# Patient Record
Sex: MALE | Race: OTHER | NOT HISPANIC OR LATINO | Employment: FULL TIME | URBAN - METROPOLITAN AREA
[De-identification: names, ages, dates, MRNs, and addresses within clinical notes are randomized per-mention and may not be internally consistent; named-entity substitution may affect disease eponyms.]

---

## 2023-11-02 ENCOUNTER — HOSPITAL ENCOUNTER (INPATIENT)
Facility: HOSPITAL | Age: 53
LOS: 5 days | Discharge: HOME | DRG: 502 | End: 2023-11-07
Attending: EMERGENCY MEDICINE | Admitting: SURGERY
Payer: COMMERCIAL

## 2023-11-02 DIAGNOSIS — S51.002A: Primary | ICD-10-CM

## 2023-11-02 DIAGNOSIS — Z01.818 PREOP TESTING: ICD-10-CM

## 2023-11-02 DIAGNOSIS — S53.115A: Primary | ICD-10-CM

## 2023-11-02 DIAGNOSIS — S52.132C: ICD-10-CM

## 2023-11-02 LAB
ALBUMIN SERPL-MCNC: 3.7 G/DL (ref 3.5–5)
ALBUMIN/GLOB SERPL: 1.6 RATIO (ref 1.1–2)
ALP SERPL-CCNC: 49 UNIT/L (ref 40–150)
ALT SERPL-CCNC: 30 UNIT/L (ref 0–55)
AST SERPL-CCNC: 23 UNIT/L (ref 5–34)
BASOPHILS # BLD AUTO: 0.04 X10(3)/MCL
BASOPHILS NFR BLD AUTO: 0.2 %
BILIRUB SERPL-MCNC: 0.7 MG/DL
BUN SERPL-MCNC: 16.4 MG/DL (ref 8.4–25.7)
CALCIUM SERPL-MCNC: 8.4 MG/DL (ref 8.4–10.2)
CHLORIDE SERPL-SCNC: 107 MMOL/L (ref 98–107)
CK SERPL-CCNC: 387 U/L (ref 30–200)
CO2 SERPL-SCNC: 27 MMOL/L (ref 22–29)
CREAT SERPL-MCNC: 0.9 MG/DL (ref 0.73–1.18)
EOSINOPHIL # BLD AUTO: 0.01 X10(3)/MCL (ref 0–0.9)
EOSINOPHIL NFR BLD AUTO: 0.1 %
ERYTHROCYTE [DISTWIDTH] IN BLOOD BY AUTOMATED COUNT: 12.4 % (ref 11.5–17)
GFR SERPLBLD CREATININE-BSD FMLA CKD-EPI: >60 MLS/MIN/1.73/M2
GLOBULIN SER-MCNC: 2.3 GM/DL (ref 2.4–3.5)
GLUCOSE SERPL-MCNC: 117 MG/DL (ref 74–100)
HCT VFR BLD AUTO: 37.5 % (ref 42–52)
HGB BLD-MCNC: 13 G/DL (ref 14–18)
IMM GRANULOCYTES # BLD AUTO: 0.07 X10(3)/MCL (ref 0–0.04)
IMM GRANULOCYTES NFR BLD AUTO: 0.4 %
LACTATE SERPL-SCNC: 1.5 MMOL/L (ref 0.5–2.2)
LYMPHOCYTES # BLD AUTO: 0.81 X10(3)/MCL (ref 0.6–4.6)
LYMPHOCYTES NFR BLD AUTO: 5 %
MCH RBC QN AUTO: 30.7 PG (ref 27–31)
MCHC RBC AUTO-ENTMCNC: 34.7 G/DL (ref 33–36)
MCV RBC AUTO: 88.4 FL (ref 80–94)
MONOCYTES # BLD AUTO: 1.34 X10(3)/MCL (ref 0.1–1.3)
MONOCYTES NFR BLD AUTO: 8.3 %
NEUTROPHILS # BLD AUTO: 13.81 X10(3)/MCL (ref 2.1–9.2)
NEUTROPHILS NFR BLD AUTO: 86 %
NRBC BLD AUTO-RTO: 0 %
PLATELET # BLD AUTO: 166 X10(3)/MCL (ref 130–400)
PMV BLD AUTO: 10.5 FL (ref 7.4–10.4)
POTASSIUM SERPL-SCNC: 4.2 MMOL/L (ref 3.5–5.1)
PROT SERPL-MCNC: 6 GM/DL (ref 6.4–8.3)
RBC # BLD AUTO: 4.24 X10(6)/MCL (ref 4.7–6.1)
SODIUM SERPL-SCNC: 139 MMOL/L (ref 136–145)
WBC # SPEC AUTO: 16.08 X10(3)/MCL (ref 4.5–11.5)

## 2023-11-02 PROCEDURE — 96374 THER/PROPH/DIAG INJ IV PUSH: CPT

## 2023-11-02 PROCEDURE — 99285 EMERGENCY DEPT VISIT HI MDM: CPT | Mod: 25

## 2023-11-02 PROCEDURE — 63600175 PHARM REV CODE 636 W HCPCS: Performed by: NURSE PRACTITIONER

## 2023-11-02 PROCEDURE — 85025 COMPLETE CBC W/AUTO DIFF WBC: CPT | Performed by: NURSE PRACTITIONER

## 2023-11-02 PROCEDURE — 83605 ASSAY OF LACTIC ACID: CPT | Performed by: NURSE PRACTITIONER

## 2023-11-02 PROCEDURE — 80053 COMPREHEN METABOLIC PANEL: CPT | Performed by: NURSE PRACTITIONER

## 2023-11-02 PROCEDURE — 93005 ELECTROCARDIOGRAM TRACING: CPT

## 2023-11-02 PROCEDURE — 11000001 HC ACUTE MED/SURG PRIVATE ROOM

## 2023-11-02 PROCEDURE — 25000003 PHARM REV CODE 250: Performed by: NURSE PRACTITIONER

## 2023-11-02 PROCEDURE — 82550 ASSAY OF CK (CPK): CPT | Performed by: NURSE PRACTITIONER

## 2023-11-02 PROCEDURE — 63600175 PHARM REV CODE 636 W HCPCS: Performed by: EMERGENCY MEDICINE

## 2023-11-02 RX ORDER — ENOXAPARIN SODIUM 100 MG/ML
40 INJECTION SUBCUTANEOUS EVERY 12 HOURS
Status: DISCONTINUED | OUTPATIENT
Start: 2023-11-02 | End: 2023-11-04

## 2023-11-02 RX ORDER — ACETAMINOPHEN 325 MG/1
650 TABLET ORAL EVERY 4 HOURS
Status: DISCONTINUED | OUTPATIENT
Start: 2023-11-02 | End: 2023-11-07 | Stop reason: HOSPADM

## 2023-11-02 RX ORDER — HYDROMORPHONE HYDROCHLORIDE 2 MG/ML
1 INJECTION, SOLUTION INTRAMUSCULAR; INTRAVENOUS; SUBCUTANEOUS EVERY 4 HOURS PRN
Status: DISCONTINUED | OUTPATIENT
Start: 2023-11-03 | End: 2023-11-07 | Stop reason: HOSPADM

## 2023-11-02 RX ORDER — HYDROMORPHONE HYDROCHLORIDE 2 MG/ML
1 INJECTION, SOLUTION INTRAMUSCULAR; INTRAVENOUS; SUBCUTANEOUS
Status: COMPLETED | OUTPATIENT
Start: 2023-11-02 | End: 2023-11-02

## 2023-11-02 RX ORDER — OXYCODONE HYDROCHLORIDE 5 MG/1
5 TABLET ORAL EVERY 4 HOURS PRN
Status: DISCONTINUED | OUTPATIENT
Start: 2023-11-02 | End: 2023-11-07 | Stop reason: HOSPADM

## 2023-11-02 RX ORDER — MUPIROCIN 20 MG/G
OINTMENT TOPICAL 2 TIMES DAILY
Status: DISCONTINUED | OUTPATIENT
Start: 2023-11-03 | End: 2023-11-07 | Stop reason: HOSPADM

## 2023-11-02 RX ORDER — DOCUSATE SODIUM 100 MG/1
100 CAPSULE, LIQUID FILLED ORAL 2 TIMES DAILY
Status: DISCONTINUED | OUTPATIENT
Start: 2023-11-02 | End: 2023-11-07 | Stop reason: HOSPADM

## 2023-11-02 RX ORDER — GABAPENTIN 300 MG/1
300 CAPSULE ORAL 3 TIMES DAILY
Status: DISCONTINUED | OUTPATIENT
Start: 2023-11-02 | End: 2023-11-07 | Stop reason: HOSPADM

## 2023-11-02 RX ORDER — SODIUM CHLORIDE, SODIUM LACTATE, POTASSIUM CHLORIDE, CALCIUM CHLORIDE 600; 310; 30; 20 MG/100ML; MG/100ML; MG/100ML; MG/100ML
INJECTION, SOLUTION INTRAVENOUS CONTINUOUS
Status: DISCONTINUED | OUTPATIENT
Start: 2023-11-02 | End: 2023-11-07 | Stop reason: HOSPADM

## 2023-11-02 RX ORDER — METHOCARBAMOL 500 MG/1
500 TABLET, FILM COATED ORAL EVERY 8 HOURS
Status: DISCONTINUED | OUTPATIENT
Start: 2023-11-02 | End: 2023-11-07 | Stop reason: HOSPADM

## 2023-11-02 RX ORDER — CEFAZOLIN SODIUM 2 G/50ML
2 SOLUTION INTRAVENOUS
Status: DISCONTINUED | OUTPATIENT
Start: 2023-11-02 | End: 2023-11-03

## 2023-11-02 RX ORDER — OXYCODONE HYDROCHLORIDE 10 MG/1
10 TABLET ORAL EVERY 4 HOURS PRN
Status: DISCONTINUED | OUTPATIENT
Start: 2023-11-02 | End: 2023-11-07 | Stop reason: HOSPADM

## 2023-11-02 RX ORDER — ADHESIVE BANDAGE
30 BANDAGE TOPICAL DAILY PRN
Status: DISCONTINUED | OUTPATIENT
Start: 2023-11-02 | End: 2023-11-07 | Stop reason: HOSPADM

## 2023-11-02 RX ORDER — TALC
6 POWDER (GRAM) TOPICAL NIGHTLY PRN
Status: DISCONTINUED | OUTPATIENT
Start: 2023-11-02 | End: 2023-11-07 | Stop reason: HOSPADM

## 2023-11-02 RX ADMIN — OXYCODONE HYDROCHLORIDE 10 MG: 10 TABLET ORAL at 10:11

## 2023-11-02 RX ADMIN — DOCUSATE SODIUM 100 MG: 100 CAPSULE, LIQUID FILLED ORAL at 09:11

## 2023-11-02 RX ADMIN — CEFAZOLIN SODIUM 2 G: 2 SOLUTION INTRAVENOUS at 11:11

## 2023-11-02 RX ADMIN — HYDROMORPHONE HYDROCHLORIDE 1 MG: 2 INJECTION INTRAMUSCULAR; INTRAVENOUS; SUBCUTANEOUS at 07:11

## 2023-11-02 RX ADMIN — METHOCARBAMOL 500 MG: 500 TABLET ORAL at 09:11

## 2023-11-02 RX ADMIN — GABAPENTIN 300 MG: 300 CAPSULE ORAL at 09:11

## 2023-11-02 RX ADMIN — ACETAMINOPHEN 650 MG: 325 TABLET, FILM COATED ORAL at 09:11

## 2023-11-02 RX ADMIN — SODIUM CHLORIDE, POTASSIUM CHLORIDE, SODIUM LACTATE AND CALCIUM CHLORIDE: 600; 310; 30; 20 INJECTION, SOLUTION INTRAVENOUS at 10:11

## 2023-11-03 ENCOUNTER — ANESTHESIA EVENT (OUTPATIENT)
Dept: SURGERY | Facility: HOSPITAL | Age: 53
DRG: 502 | End: 2023-11-03
Payer: COMMERCIAL

## 2023-11-03 ENCOUNTER — ANESTHESIA (OUTPATIENT)
Dept: SURGERY | Facility: HOSPITAL | Age: 53
DRG: 502 | End: 2023-11-03
Payer: COMMERCIAL

## 2023-11-03 PROBLEM — S52.132B: Status: ACTIVE | Noted: 2023-11-03

## 2023-11-03 PROBLEM — S52.132C: Status: ACTIVE | Noted: 2023-11-03

## 2023-11-03 PROBLEM — S53.105A DISLOCATION OF LEFT ELBOW: Status: ACTIVE | Noted: 2023-11-03

## 2023-11-03 LAB
ABORH RETYPE: NORMAL
ALBUMIN SERPL-MCNC: 3.2 G/DL (ref 3.5–5)
ALBUMIN/GLOB SERPL: 1.3 RATIO (ref 1.1–2)
ALP SERPL-CCNC: 38 UNIT/L (ref 40–150)
ALT SERPL-CCNC: 23 UNIT/L (ref 0–55)
AST SERPL-CCNC: 21 UNIT/L (ref 5–34)
BASOPHILS # BLD AUTO: 0.03 X10(3)/MCL
BASOPHILS NFR BLD AUTO: 0.2 %
BILIRUB SERPL-MCNC: 0.9 MG/DL
BUN SERPL-MCNC: 12.6 MG/DL (ref 8.4–25.7)
CALCIUM SERPL-MCNC: 8.2 MG/DL (ref 8.4–10.2)
CHLORIDE SERPL-SCNC: 107 MMOL/L (ref 98–107)
CO2 SERPL-SCNC: 26 MMOL/L (ref 22–29)
CREAT SERPL-MCNC: 0.8 MG/DL (ref 0.73–1.18)
EOSINOPHIL # BLD AUTO: 0.06 X10(3)/MCL (ref 0–0.9)
EOSINOPHIL NFR BLD AUTO: 0.5 %
ERYTHROCYTE [DISTWIDTH] IN BLOOD BY AUTOMATED COUNT: 12.7 % (ref 11.5–17)
GFR SERPLBLD CREATININE-BSD FMLA CKD-EPI: >60 MLS/MIN/1.73/M2
GLOBULIN SER-MCNC: 2.4 GM/DL (ref 2.4–3.5)
GLUCOSE SERPL-MCNC: 106 MG/DL (ref 74–100)
GROUP & RH: NORMAL
HCT VFR BLD AUTO: 34.4 % (ref 42–52)
HGB BLD-MCNC: 12.1 G/DL (ref 14–18)
IMM GRANULOCYTES # BLD AUTO: 0.04 X10(3)/MCL (ref 0–0.04)
IMM GRANULOCYTES NFR BLD AUTO: 0.3 %
INDIRECT COOMBS GEL: NORMAL
LYMPHOCYTES # BLD AUTO: 2 X10(3)/MCL (ref 0.6–4.6)
LYMPHOCYTES NFR BLD AUTO: 15.7 %
MAGNESIUM SERPL-MCNC: 1.7 MG/DL (ref 1.6–2.6)
MCH RBC QN AUTO: 31 PG (ref 27–31)
MCHC RBC AUTO-ENTMCNC: 35.2 G/DL (ref 33–36)
MCV RBC AUTO: 88.2 FL (ref 80–94)
MONOCYTES # BLD AUTO: 1.67 X10(3)/MCL (ref 0.1–1.3)
MONOCYTES NFR BLD AUTO: 13.1 %
NEUTROPHILS # BLD AUTO: 8.93 X10(3)/MCL (ref 2.1–9.2)
NEUTROPHILS NFR BLD AUTO: 70.2 %
NRBC BLD AUTO-RTO: 0 %
PHOSPHATE SERPL-MCNC: 3.6 MG/DL (ref 2.3–4.7)
PLATELET # BLD AUTO: 149 X10(3)/MCL (ref 130–400)
PMV BLD AUTO: 10.6 FL (ref 7.4–10.4)
POTASSIUM SERPL-SCNC: 3.9 MMOL/L (ref 3.5–5.1)
PROT SERPL-MCNC: 5.6 GM/DL (ref 6.4–8.3)
RBC # BLD AUTO: 3.9 X10(6)/MCL (ref 4.7–6.1)
SODIUM SERPL-SCNC: 139 MMOL/L (ref 136–145)
SPECIMEN OUTDATE: NORMAL
WBC # SPEC AUTO: 12.73 X10(3)/MCL (ref 4.5–11.5)

## 2023-11-03 PROCEDURE — 36000711: Performed by: ORTHOPAEDIC SURGERY

## 2023-11-03 PROCEDURE — 20692 APPL MLTPLN UNI EXT FIXJ SYS: CPT | Mod: ,,, | Performed by: ORTHOPAEDIC SURGERY

## 2023-11-03 PROCEDURE — 37000009 HC ANESTHESIA EA ADD 15 MINS: Performed by: ORTHOPAEDIC SURGERY

## 2023-11-03 PROCEDURE — 24345 REPR ELBW MED LIGMNT W/TISSU: CPT | Mod: 51,LT,, | Performed by: ORTHOPAEDIC SURGERY

## 2023-11-03 PROCEDURE — 63600175 PHARM REV CODE 636 W HCPCS: Performed by: NURSE ANESTHETIST, CERTIFIED REGISTERED

## 2023-11-03 PROCEDURE — 24665 PR OPEN TX RADIAL HEAD/NECK FRACTURE: ICD-10-PCS | Mod: AS,51,LT, | Performed by: PHYSICIAN ASSISTANT

## 2023-11-03 PROCEDURE — 63600175 PHARM REV CODE 636 W HCPCS: Performed by: ANESTHESIOLOGY

## 2023-11-03 PROCEDURE — 24665 OPTX RADIAL HEAD/NECK FX: CPT | Mod: 51,LT,, | Performed by: ORTHOPAEDIC SURGERY

## 2023-11-03 PROCEDURE — 86850 RBC ANTIBODY SCREEN: CPT | Performed by: NURSE PRACTITIONER

## 2023-11-03 PROCEDURE — 20692 PR APPLY BONE MULTIPLAN,EXT FIX DEV: ICD-10-PCS | Mod: AS,,, | Performed by: PHYSICIAN ASSISTANT

## 2023-11-03 PROCEDURE — 20692 APPL MLTPLN UNI EXT FIXJ SYS: CPT | Mod: AS,,, | Performed by: PHYSICIAN ASSISTANT

## 2023-11-03 PROCEDURE — 37000008 HC ANESTHESIA 1ST 15 MINUTES: Performed by: ORTHOPAEDIC SURGERY

## 2023-11-03 PROCEDURE — 27000221 HC OXYGEN, UP TO 24 HOURS

## 2023-11-03 PROCEDURE — 24665 OPTX RADIAL HEAD/NECK FX: CPT | Mod: AS,51,LT, | Performed by: PHYSICIAN ASSISTANT

## 2023-11-03 PROCEDURE — D9220A PRA ANESTHESIA: ICD-10-PCS | Mod: CRNA,,, | Performed by: NURSE ANESTHETIST, CERTIFIED REGISTERED

## 2023-11-03 PROCEDURE — D9220A PRA ANESTHESIA: Mod: ANES,,, | Performed by: ANESTHESIOLOGY

## 2023-11-03 PROCEDURE — 25000003 PHARM REV CODE 250: Performed by: NURSE ANESTHETIST, CERTIFIED REGISTERED

## 2023-11-03 PROCEDURE — 25000003 PHARM REV CODE 250: Performed by: SURGERY

## 2023-11-03 PROCEDURE — 36000710: Performed by: ORTHOPAEDIC SURGERY

## 2023-11-03 PROCEDURE — 63600175 PHARM REV CODE 636 W HCPCS: Performed by: PHYSICIAN ASSISTANT

## 2023-11-03 PROCEDURE — D9220A PRA ANESTHESIA: ICD-10-PCS | Mod: ANES,,, | Performed by: ANESTHESIOLOGY

## 2023-11-03 PROCEDURE — 63600175 PHARM REV CODE 636 W HCPCS: Performed by: NURSE PRACTITIONER

## 2023-11-03 PROCEDURE — D9220A PRA ANESTHESIA: Mod: CRNA,,, | Performed by: NURSE ANESTHETIST, CERTIFIED REGISTERED

## 2023-11-03 PROCEDURE — 63600175 PHARM REV CODE 636 W HCPCS: Performed by: ORTHOPAEDIC SURGERY

## 2023-11-03 PROCEDURE — 85025 COMPLETE CBC W/AUTO DIFF WBC: CPT | Performed by: NURSE PRACTITIONER

## 2023-11-03 PROCEDURE — 84100 ASSAY OF PHOSPHORUS: CPT | Performed by: NURSE PRACTITIONER

## 2023-11-03 PROCEDURE — 25000003 PHARM REV CODE 250: Performed by: NURSE PRACTITIONER

## 2023-11-03 PROCEDURE — 20692 PR APPLY BONE MULTIPLAN,EXT FIX DEV: ICD-10-PCS | Mod: ,,, | Performed by: ORTHOPAEDIC SURGERY

## 2023-11-03 PROCEDURE — 71000033 HC RECOVERY, INTIAL HOUR: Performed by: ORTHOPAEDIC SURGERY

## 2023-11-03 PROCEDURE — C1713 ANCHOR/SCREW BN/BN,TIS/BN: HCPCS | Performed by: ORTHOPAEDIC SURGERY

## 2023-11-03 PROCEDURE — 24665 PR OPEN TX RADIAL HEAD/NECK FRACTURE: ICD-10-PCS | Mod: 51,LT,, | Performed by: ORTHOPAEDIC SURGERY

## 2023-11-03 PROCEDURE — 83735 ASSAY OF MAGNESIUM: CPT | Performed by: NURSE PRACTITIONER

## 2023-11-03 PROCEDURE — 24345 PR REELBW MED LIGMNT W/TISS: ICD-10-PCS | Mod: 51,LT,, | Performed by: ORTHOPAEDIC SURGERY

## 2023-11-03 PROCEDURE — 25000003 PHARM REV CODE 250: Performed by: PHYSICIAN ASSISTANT

## 2023-11-03 PROCEDURE — 11000001 HC ACUTE MED/SURG PRIVATE ROOM

## 2023-11-03 PROCEDURE — 27201423 OPTIME MED/SURG SUP & DEVICES STERILE SUPPLY: Performed by: ORTHOPAEDIC SURGERY

## 2023-11-03 PROCEDURE — 80053 COMPREHEN METABOLIC PANEL: CPT | Performed by: NURSE PRACTITIONER

## 2023-11-03 DEVICE — IMPLANTABLE DEVICE
Type: IMPLANTABLE DEVICE | Site: ELBOW | Status: NON-FUNCTIONAL
Removed: 2024-01-24

## 2023-11-03 RX ORDER — VANCOMYCIN HYDROCHLORIDE 1 G/20ML
INJECTION, POWDER, LYOPHILIZED, FOR SOLUTION INTRAVENOUS
Status: DISCONTINUED | OUTPATIENT
Start: 2023-11-03 | End: 2023-11-03 | Stop reason: HOSPADM

## 2023-11-03 RX ORDER — PROCHLORPERAZINE EDISYLATE 5 MG/ML
5 INJECTION INTRAMUSCULAR; INTRAVENOUS EVERY 30 MIN PRN
Status: DISCONTINUED | OUTPATIENT
Start: 2023-11-03 | End: 2023-11-03 | Stop reason: HOSPADM

## 2023-11-03 RX ORDER — POLYETHYLENE GLYCOL 3350 17 G/17G
17 POWDER, FOR SOLUTION ORAL DAILY
Status: DISCONTINUED | OUTPATIENT
Start: 2023-11-03 | End: 2023-11-07 | Stop reason: HOSPADM

## 2023-11-03 RX ORDER — CEFAZOLIN SODIUM 1 G/3ML
INJECTION, POWDER, FOR SOLUTION INTRAMUSCULAR; INTRAVENOUS
Status: DISCONTINUED | OUTPATIENT
Start: 2023-11-03 | End: 2023-11-03

## 2023-11-03 RX ORDER — ROCURONIUM BROMIDE 10 MG/ML
INJECTION, SOLUTION INTRAVENOUS
Status: DISCONTINUED | OUTPATIENT
Start: 2023-11-03 | End: 2023-11-03

## 2023-11-03 RX ORDER — SUCCINYLCHOLINE CHLORIDE 20 MG/ML
INJECTION INTRAMUSCULAR; INTRAVENOUS
Status: DISCONTINUED | OUTPATIENT
Start: 2023-11-03 | End: 2023-11-03

## 2023-11-03 RX ORDER — ONDANSETRON 2 MG/ML
INJECTION INTRAMUSCULAR; INTRAVENOUS
Status: DISCONTINUED | OUTPATIENT
Start: 2023-11-03 | End: 2023-11-03

## 2023-11-03 RX ORDER — KETOROLAC TROMETHAMINE 30 MG/ML
15 INJECTION, SOLUTION INTRAMUSCULAR; INTRAVENOUS EVERY 6 HOURS
Status: DISPENSED | OUTPATIENT
Start: 2023-11-03 | End: 2023-11-04

## 2023-11-03 RX ORDER — PROPOFOL 10 MG/ML
VIAL (ML) INTRAVENOUS
Status: DISCONTINUED | OUTPATIENT
Start: 2023-11-03 | End: 2023-11-03

## 2023-11-03 RX ORDER — ACETAMINOPHEN 10 MG/ML
INJECTION, SOLUTION INTRAVENOUS
Status: DISCONTINUED | OUTPATIENT
Start: 2023-11-03 | End: 2023-11-03

## 2023-11-03 RX ORDER — METHOCARBAMOL 100 MG/ML
750 INJECTION, SOLUTION INTRAMUSCULAR; INTRAVENOUS ONCE AS NEEDED
Status: COMPLETED | OUTPATIENT
Start: 2023-11-03 | End: 2023-11-03

## 2023-11-03 RX ORDER — DEXAMETHASONE SODIUM PHOSPHATE 4 MG/ML
INJECTION, SOLUTION INTRA-ARTICULAR; INTRALESIONAL; INTRAMUSCULAR; INTRAVENOUS; SOFT TISSUE
Status: DISCONTINUED | OUTPATIENT
Start: 2023-11-03 | End: 2023-11-03

## 2023-11-03 RX ORDER — MIDAZOLAM HYDROCHLORIDE 1 MG/ML
INJECTION INTRAMUSCULAR; INTRAVENOUS
Status: DISCONTINUED | OUTPATIENT
Start: 2023-11-03 | End: 2023-11-03

## 2023-11-03 RX ORDER — KETOROLAC TROMETHAMINE 30 MG/ML
15 INJECTION, SOLUTION INTRAMUSCULAR; INTRAVENOUS ONCE AS NEEDED
Status: COMPLETED | OUTPATIENT
Start: 2023-11-03 | End: 2023-11-03

## 2023-11-03 RX ORDER — ONDANSETRON 2 MG/ML
4 INJECTION INTRAMUSCULAR; INTRAVENOUS EVERY 6 HOURS PRN
Status: DISCONTINUED | OUTPATIENT
Start: 2023-11-03 | End: 2023-11-07 | Stop reason: HOSPADM

## 2023-11-03 RX ORDER — SODIUM CHLORIDE 0.9 % (FLUSH) 0.9 %
10 SYRINGE (ML) INJECTION
Status: DISCONTINUED | OUTPATIENT
Start: 2023-11-03 | End: 2023-11-03 | Stop reason: HOSPADM

## 2023-11-03 RX ORDER — MORPHINE SULFATE 10 MG/ML
4 INJECTION INTRAMUSCULAR; INTRAVENOUS; SUBCUTANEOUS EVERY 6 HOURS PRN
Status: DISCONTINUED | OUTPATIENT
Start: 2023-11-03 | End: 2023-11-03

## 2023-11-03 RX ORDER — ONDANSETRON 2 MG/ML
4 INJECTION INTRAMUSCULAR; INTRAVENOUS ONCE AS NEEDED
Status: DISCONTINUED | OUTPATIENT
Start: 2023-11-03 | End: 2023-11-03 | Stop reason: HOSPADM

## 2023-11-03 RX ORDER — PHENYLEPHRINE HYDROCHLORIDE 10 MG/ML
INJECTION INTRAVENOUS
Status: DISCONTINUED | OUTPATIENT
Start: 2023-11-03 | End: 2023-11-03

## 2023-11-03 RX ORDER — FENTANYL CITRATE 50 UG/ML
100 INJECTION, SOLUTION INTRAMUSCULAR; INTRAVENOUS ONCE
Status: COMPLETED | OUTPATIENT
Start: 2023-11-03 | End: 2023-11-03

## 2023-11-03 RX ORDER — MEPERIDINE HYDROCHLORIDE 25 MG/ML
12.5 INJECTION INTRAMUSCULAR; INTRAVENOUS; SUBCUTANEOUS EVERY 10 MIN PRN
Status: DISCONTINUED | OUTPATIENT
Start: 2023-11-03 | End: 2023-11-03 | Stop reason: HOSPADM

## 2023-11-03 RX ORDER — HYDROMORPHONE HYDROCHLORIDE 2 MG/ML
0.4 INJECTION, SOLUTION INTRAMUSCULAR; INTRAVENOUS; SUBCUTANEOUS EVERY 5 MIN PRN
Status: DISCONTINUED | OUTPATIENT
Start: 2023-11-03 | End: 2023-11-03 | Stop reason: HOSPADM

## 2023-11-03 RX ORDER — DEXMEDETOMIDINE HYDROCHLORIDE 100 UG/ML
INJECTION, SOLUTION INTRAVENOUS
Status: DISCONTINUED | OUTPATIENT
Start: 2023-11-03 | End: 2023-11-03

## 2023-11-03 RX ORDER — FENTANYL CITRATE 50 UG/ML
INJECTION, SOLUTION INTRAMUSCULAR; INTRAVENOUS
Status: DISCONTINUED | OUTPATIENT
Start: 2023-11-03 | End: 2023-11-03

## 2023-11-03 RX ADMIN — ACETAMINOPHEN 1000 MG: 10 INJECTION, SOLUTION INTRAVENOUS at 01:11

## 2023-11-03 RX ADMIN — OXYCODONE HYDROCHLORIDE 10 MG: 10 TABLET ORAL at 04:11

## 2023-11-03 RX ADMIN — ROCURONIUM BROMIDE 5 MG: 10 SOLUTION INTRAVENOUS at 12:11

## 2023-11-03 RX ADMIN — FENTANYL CITRATE 100 MCG: 50 INJECTION, SOLUTION INTRAMUSCULAR; INTRAVENOUS at 11:11

## 2023-11-03 RX ADMIN — FENTANYL CITRATE 50 MCG: 50 INJECTION, SOLUTION INTRAMUSCULAR; INTRAVENOUS at 02:11

## 2023-11-03 RX ADMIN — MUPIROCIN: 20 OINTMENT TOPICAL at 04:11

## 2023-11-03 RX ADMIN — GABAPENTIN 300 MG: 300 CAPSULE ORAL at 04:11

## 2023-11-03 RX ADMIN — METHOCARBAMOL 500 MG: 500 TABLET ORAL at 08:11

## 2023-11-03 RX ADMIN — MIDAZOLAM HYDROCHLORIDE 2 MG: 1 INJECTION, SOLUTION INTRAMUSCULAR; INTRAVENOUS at 12:11

## 2023-11-03 RX ADMIN — KETOROLAC TROMETHAMINE 15 MG: 30 INJECTION, SOLUTION INTRAMUSCULAR; INTRAVENOUS at 03:11

## 2023-11-03 RX ADMIN — DOCUSATE SODIUM 100 MG: 100 CAPSULE, LIQUID FILLED ORAL at 08:11

## 2023-11-03 RX ADMIN — ACETAMINOPHEN 650 MG: 325 TABLET, FILM COATED ORAL at 02:11

## 2023-11-03 RX ADMIN — DOCUSATE SODIUM 100 MG: 100 CAPSULE, LIQUID FILLED ORAL at 09:11

## 2023-11-03 RX ADMIN — HYDROMORPHONE HYDROCHLORIDE 0.4 MG: 2 INJECTION INTRAMUSCULAR; INTRAVENOUS; SUBCUTANEOUS at 02:11

## 2023-11-03 RX ADMIN — PHENYLEPHRINE HYDROCHLORIDE 100 MCG: 10 INJECTION INTRAVENOUS at 12:11

## 2023-11-03 RX ADMIN — SODIUM CHLORIDE, POTASSIUM CHLORIDE, SODIUM LACTATE AND CALCIUM CHLORIDE: 600; 310; 30; 20 INJECTION, SOLUTION INTRAVENOUS at 07:11

## 2023-11-03 RX ADMIN — HYDROMORPHONE HYDROCHLORIDE 0.4 MG: 2 INJECTION INTRAMUSCULAR; INTRAVENOUS; SUBCUTANEOUS at 03:11

## 2023-11-03 RX ADMIN — ONDANSETRON 4 MG: 2 INJECTION INTRAMUSCULAR; INTRAVENOUS at 12:11

## 2023-11-03 RX ADMIN — SODIUM CHLORIDE, POTASSIUM CHLORIDE, SODIUM LACTATE AND CALCIUM CHLORIDE: 600; 310; 30; 20 INJECTION, SOLUTION INTRAVENOUS at 09:11

## 2023-11-03 RX ADMIN — GABAPENTIN 300 MG: 300 CAPSULE ORAL at 09:11

## 2023-11-03 RX ADMIN — OXYCODONE HYDROCHLORIDE 5 MG: 5 TABLET ORAL at 07:11

## 2023-11-03 RX ADMIN — Medication 6 MG: at 09:11

## 2023-11-03 RX ADMIN — SODIUM CHLORIDE, SODIUM GLUCONATE, SODIUM ACETATE, POTASSIUM CHLORIDE AND MAGNESIUM CHLORIDE: 526; 502; 368; 37; 30 INJECTION, SOLUTION INTRAVENOUS at 12:11

## 2023-11-03 RX ADMIN — KETOROLAC TROMETHAMINE 15 MG: 30 INJECTION, SOLUTION INTRAMUSCULAR; INTRAVENOUS at 11:11

## 2023-11-03 RX ADMIN — CEFAZOLIN 2 G: 330 INJECTION, POWDER, FOR SOLUTION INTRAMUSCULAR; INTRAVENOUS at 12:11

## 2023-11-03 RX ADMIN — ACETAMINOPHEN 650 MG: 325 TABLET, FILM COATED ORAL at 05:11

## 2023-11-03 RX ADMIN — CEFTRIAXONE SODIUM 2 G: 2 INJECTION, POWDER, FOR SOLUTION INTRAMUSCULAR; INTRAVENOUS at 04:11

## 2023-11-03 RX ADMIN — DEXMEDETOMIDINE 6 MCG: 200 INJECTION, SOLUTION INTRAVENOUS at 02:11

## 2023-11-03 RX ADMIN — ROCURONIUM BROMIDE 45 MG: 10 SOLUTION INTRAVENOUS at 12:11

## 2023-11-03 RX ADMIN — FENTANYL CITRATE 100 MCG: 50 INJECTION, SOLUTION INTRAMUSCULAR; INTRAVENOUS at 12:11

## 2023-11-03 RX ADMIN — OXYCODONE HYDROCHLORIDE 5 MG: 5 TABLET ORAL at 09:11

## 2023-11-03 RX ADMIN — POLYETHYLENE GLYCOL 3350 17 G: 17 POWDER, FOR SOLUTION ORAL at 04:11

## 2023-11-03 RX ADMIN — DEXAMETHASONE SODIUM PHOSPHATE 8 MG: 4 INJECTION, SOLUTION INTRA-ARTICULAR; INTRALESIONAL; INTRAMUSCULAR; INTRAVENOUS; SOFT TISSUE at 12:11

## 2023-11-03 RX ADMIN — ENOXAPARIN SODIUM 40 MG: 40 INJECTION SUBCUTANEOUS at 08:11

## 2023-11-03 RX ADMIN — GABAPENTIN 300 MG: 300 CAPSULE ORAL at 08:11

## 2023-11-03 RX ADMIN — PROPOFOL 20 MG: 10 INJECTION, EMULSION INTRAVENOUS at 02:11

## 2023-11-03 RX ADMIN — MUPIROCIN: 20 OINTMENT TOPICAL at 08:11

## 2023-11-03 RX ADMIN — METHOCARBAMOL 750 MG: 100 INJECTION INTRAMUSCULAR; INTRAVENOUS at 03:11

## 2023-11-03 RX ADMIN — METHOCARBAMOL 500 MG: 500 TABLET ORAL at 05:11

## 2023-11-03 RX ADMIN — PROPOFOL 170 MG: 10 INJECTION, EMULSION INTRAVENOUS at 12:11

## 2023-11-03 RX ADMIN — CEFAZOLIN SODIUM 2 G: 2 SOLUTION INTRAVENOUS at 07:11

## 2023-11-03 RX ADMIN — ENOXAPARIN SODIUM 40 MG: 40 INJECTION SUBCUTANEOUS at 09:11

## 2023-11-03 RX ADMIN — SUCCINYLCHOLINE CHLORIDE 160 MG: 20 INJECTION, SOLUTION INTRAMUSCULAR; INTRAVENOUS at 12:11

## 2023-11-03 NOTE — ANESTHESIA PREPROCEDURE EVALUATION
11/03/2023  Fabio Mcclendon is a 53 y.o., male.    open, elbow fracture dislocation. Reports he was standing on a 5 galloon bucket when he fell backward and attempted to catch himself with his LUE. His wife assisted with applying a belt which was exchanged for tourniquet on EMS arrival. Elbow was reduced PTA with continued displacement of the radial head along with radial neck and suspected coronoid Fx. He was taken to the OR due to bleeding where branches of the brachial artery and suspected venous bleeds were ligated. A CTA is is without abnormality. Hgb is 13, stable from OSH.         Pre-op Assessment    I have reviewed the Patient Summary Reports.     I have reviewed the Nursing Notes. I have reviewed the NPO Status.   I have reviewed the Medications.     Review of Systems  Anesthesia Hx:  No problems with previous Anesthesia        Physical Exam  General: Well nourished, Cooperative, Alert and Oriented    Airway:  Mallampati: II   Mouth Opening: Normal  TM Distance: Normal  Tongue: Normal  Neck ROM: Normal ROM    Dental:  Intact        Anesthesia Plan  Type of Anesthesia, risks & benefits discussed:    Anesthesia Type: Gen ETT  Intra-op Monitoring Plan: Standard ASA Monitors  Post Op Pain Control Plan: multimodal analgesia  Induction:  IV  Airway Plan: Direct, Post-Induction  Informed Consent: Informed consent signed with the Patient and all parties understand the risks and agree with anesthesia plan.  All questions answered. Patient consented to blood products? Yes  ASA Score: 2  Day of Surgery Review of History & Physical: H&P Update referred to the surgeon/provider.    Ready For Surgery From Anesthesia Perspective.     .

## 2023-11-03 NOTE — PROGRESS NOTES
Pt Hx and procedure discussed in detail. Post op orders reviewed. Pt Hx and procedure discussed in detail. Pt attached to v/s machine and vitals WNL. Procedure site and IV assessed and intact. Everyone understands pt info with no further questions.

## 2023-11-03 NOTE — BRIEF OP NOTE
[facility] - Periop Services  Brief Operative Note    SUMMARY     Surgery Date: 11/3/2023     Surgeon(s) and Role:     * Marco Black MD - Primary    Assisting Surgeon: None    Pre-op Diagnosis:  Open anterior dislocation of left elbow, initial encounter [S53.115A, S51.002A]  Open Radial Head fracture, type III    Post-op Diagnosis:  Post-Op Diagnosis Codes:     * Open anterior dislocation of left elbow, initial encounter [S53.115A, S51.002A]  Open Radial Head fracture, type III    Procedure(s) (LRB):  INCISION AND DRAINAGE, UPPER EXTREMITY (Left)- open fx 54810  ORIF, ELBOW (Left)- open treatment of radial head fracture-   Repair of medial collateral ligaments of L elbow- 33688  Application of unilateral multiplane ex fix- 20692      Anesthesia: General    Implants:  Implant Name Type Inv. Item Serial No.  Lot No. LRB No. Used Action   SCREW, POLYAXIAL NON LOCKING, [id number], TI    SKELETAL  DYNAMICS [facility]  Left 1 Implanted   SCREW, POLYAXIAL NON LOCKING, [id number], TI    SKELETAL  DYNAMICS [facility]  Left 1 Implanted   IJS-E AXIS PIN 2.5MM X 45MM    SKELETAL  DYNAMICS [facility]  Left 1 Implanted   IJS-E BASE PLATE ASSEMBLY    SKELETAL  DYNAMICS [facility]  Left 1 Implanted       Operative Findings: see op report    Estimated Blood Loss: 20mL    Estimated Blood Loss has been documented.           Specimens:   Specimen (24h ago, onward)      None            [id number]  A/P: Tolerated procedure well. Plan to return to OR Sunday for I&D of elbow and possible radial head replacement. Will change to ceftriaxone for abx management. Continue until 24 hrs after radial head replacement done. SUSANA JONES. Keep splint c/d/I. Lovenox for DVT ppx.      Macro Black MD  Orthopedic Trauma  [facility]

## 2023-11-03 NOTE — H&P
Trauma Surgery   History and Physical Note    Patient Name: Fabio Mcclendon  YOB: 1970  Date: 11/02/2023 9:05 PM  Date of Admission: 11/2/2023  HD#0  POD#* No surgery found *    PRESENTING HISTORY   Chief Complaint/Reason for Admission: <principal problem not specified>    History of Present Illness:  53 year old male presents from [facility] due to lt open, elbow fracture dislocation. Reports he was standing on a 5 galloon bucket when he fell backward and attempted to catch himself with his LUE. His wife assisted with applying a belt which was exchanged for tourniquet on EMS arrival. Elbow was reduced PTA with continued displacement of the radial head along with radial neck and suspected coronoid Fx. He was taken to the OR due to bleeding where branches of the brachial artery and suspected venous bleeds were ligated. A CTA is is without abnormality. Hgb is 13, stable from OSH. He adds that he is currently under evaluation for LEA, not on CPAP.         Review of Systems:  12 point ROS negative except as stated in HPI    PAST HISTORY:   Past medical history:  Suspected LEA    Past surgical history:  No past surgical history on file.    Family history:  History reviewed. No pertinent family history.    Social history:  Social History     Socioeconomic History    Marital status:      Social History     Tobacco Use   Smoking Status Not on file   Smokeless Tobacco Not on file      Social History     Substance and Sexual Activity   Alcohol Use None        MEDICATIONS & ALLERGIES:   Allergies:   Review of patient's allergies indicates:   Allergen Reactions    Levaquin [levofloxacin] Rash    Opioids - morphine analogues Rash     Home Meds: No current outpatient medications   No current facility-administered medications on file prior to encounter.     No current outpatient medications on file prior to encounter.      No current facility-administered medications on file prior to encounter.     No  "current outpatient medications on file prior to encounter.     Scheduled Meds:   acetaminophen  650 mg Oral Q4H    docusate sodium  100 mg Oral BID    enoxparin  40 mg Subcutaneous Q12H (prophylaxis, 0900/2100)    gabapentin  300 mg Oral TID    methocarbamoL  500 mg Oral Q8H     Continuous Infusions:   lactated ringers       PRN Meds:magnesium hydroxide 400 mg/5 ml, melatonin, oxyCODONE, oxyCODONE    OBJECTIVE:   Vital Signs:  VITAL SIGNS: 24 HR MIN & MAX LAST   Temp  Min: 99.3 °F (37.4 °C)  Max: 99.3 °F (37.4 °C)  99.3 °F (37.4 °C)   BP  Min: 115/79  Max: 145/80  115/79    Pulse  Min: 87  Max: 91  87    Resp  Min: 13  Max: 16  13    SpO2  Min: 99 %  Max: 99 %  99 %      HT: 5' 9" (175.3 cm)  WT: 89.4 kg (197 lb)  BMI: 29.1   Intake/output: No intake/output data recorded.     Lines/drains/airway:       Peripheral IV - Single Lumen 11/02/23 2056 20 G Right Antecubital (Active)   Site Assessment Clean;Dry;Intact 11/02/23 2056   Number of days: 0            Peripheral IV - Single Lumen 11/02/23 2056 Anterior;Proximal;Right Forearm (Active)   Site Assessment Clean;Dry;Intact 11/02/23 2056   Number of days: 0       Physical Exam:  General:  Well developed, well nourished, no acute distress  HEENT:  Normocephalic, atraumatic  CV:  RR, 2+ DPs bilaterally  Resp/chest: NWOB  GI:  Abdomen soft, non-tender, non-distended  :  Deferred  MSK:  No muscle atrophy, cyanosis, peripheral edema, moving all extremities spontaneously- reduced to LUE which is in splint, has decreased sensation along thumb and 1st finger. 2+ left radial . Compartments soft   Neuro: GCS 15. CNII-XII grossly intact, alert and oriented to person, place, and time. Strength and motor function grossly intact to all extremities, sensation intact to all extremities.  Skin/Wounds:  warm dry, splint LUE     Labs:  Troponin:  No results for input(s): "TROPONINI" in the last 72 hours.  CBC:  No results for input(s): "WBC", "RBC", "HGB", "HCT", "PLT", "MCV", "MCH", " ""MCHC" in the last 72 hours.  CMP:  No results for input(s): "GLU", "CALCIUM", "ALBUMIN", "PROT", "NA", "K", "CO2", "CL", "BUN", "CREATININE", "ALKPHOS", "ALT", "AST", "BILITOT" in the last 72 hours.  Lactic Acid:  No results for input(s): "LACTATE" in the last 72 hours.  ETOH:  No results for input(s): "ETHANOL" in the last 72 hours.   Urine Drug Screen:  No results for input(s): "COCAINE", "OPIATE", "BARBITURATE", "AMPHETAMINE", "FENTANYL", "CANNABINOIDS", "MDMA" in the last 72 hours.    Invalid input(s): "BENZODIAZEPINE", "PHENCYCLIDINE"   ABG  No results for input(s): "PH", "PO2", "PCO2", "HCO3", "BE" in the last 168 hours.      Diagnostic Results:  X-Ray Chest AP Portable   Final Result      NO ACUTE CARDIOPULMONARY PROCESS IDENTIFIED.         Electronically signed by: Sumit Vasquez   Date:    11/02/2023   Time:    20:19      Xray Previous   Final Result      Xray Previous   Final Result      Xray Previous   Final Result      CT Previous   Final Result          ASSESSMENT & PLAN:    53 year old male presents from [facility] due to lt open, elbow fracture dislocation.     Admit to floor with NV checks  Ortho consulted, anticipate OR in AM   [facility]  [facility] at [place] with [facility]F   Ancef for open Fx  Daily labs  IS  VTE ppx with Lovevasiliyx       [facility]   Trauma/Acute Care Surgery  [facility]  C: [phone]       "

## 2023-11-03 NOTE — TRANSFER OF CARE
"Anesthesia Transfer of Care Note    Patient: Fabio Mcclendon    Procedure(s) Performed: Procedure(s) (LRB):  INCISION AND DRAINAGE, UPPER EXTREMITY (Left)  ORIF, ELBOW (Left)    Patient location: PACU    Anesthesia Type: general    Transport from OR: Transported from OR on room air with adequate spontaneous ventilation    Post pain: adequate analgesia    Post assessment: no apparent anesthetic complications    Post vital signs: stable    Level of consciousness: sedated, awake and responds to stimulation    Nausea/Vomiting: no nausea/vomiting    Complications: none    Transfer of care protocol was followed      Last vitals:   Visit Vitals  BP (!) 174/83   Pulse 81   Temp 36.4 °C (97.6 °F) (Oral)   Resp 20   Ht 5' 9" (1.753 m)   Wt 90.6 kg (199 lb 11.8 oz)   SpO2 97%   BMI 29.50 kg/m²     "

## 2023-11-03 NOTE — ANESTHESIA PROCEDURE NOTES
Intubation    Date/Time: 11/3/2023 12:12 PM    Performed by: Leland Delgado CRNA  Authorized by: Davida Chatman MD    Intubation:     Induction:  Intravenous    Intubated:  Postinduction    Mask Ventilation:  Easy mask    Attempts:  1    Attempted By:  CRNA    Method of Intubation:  Direct    Blade:  Webster 2    Laryngeal View Grade: Grade I - full view of cords      Difficult Airway Encountered?: No      Complications:  None    Airway Device:  Oral endotracheal tube    Airway Device Size:  7.5    Style/Cuff Inflation:  Cuffed    Tube secured:  22    Secured at:  The lips    Placement Verified By:  Capnometry and Revisualization with laryngoscopy    Complicating Factors:  None    Findings Post-Intubation:  BS equal bilateral and atraumatic/condition of teeth unchanged

## 2023-11-03 NOTE — ANESTHESIA POSTPROCEDURE EVALUATION
Anesthesia Post Evaluation    Patient: Fabio Mcclendon    Procedure(s) Performed: Procedure(s) (LRB):  INCISION AND DRAINAGE, UPPER EXTREMITY (Left)  ORIF, ELBOW (Left)    Final Anesthesia Type: general      Patient location during evaluation: PACU  Patient participation: Yes- Able to Participate  Level of consciousness: awake and alert  Post-procedure vital signs: reviewed and stable  Pain management: adequate  Airway patency: patent      Anesthetic complications: no      Cardiovascular status: hemodynamically stable  Respiratory status: unassisted  Hydration status: euvolemic  Follow-up not needed.          Vitals Value Taken Time   /85 11/03/23 1502   Temp 37.5 °C (99.5 °F) 11/03/23 1430   Pulse 84 11/03/23 1503   Resp 23 11/03/23 1503   SpO2 95 % 11/03/23 1503   Vitals shown include unvalidated device data.      No case tracking events are documented in the log.      Pain/Hyacinth Score: Pain Rating Prior to Med Admin: 10 (11/3/2023  2:50 PM)  Pain Rating Post Med Admin: 3 (11/3/2023  8:11 AM)  Hyacinth Score: 8 (11/3/2023  2:30 PM)

## 2023-11-03 NOTE — ED PROVIDER NOTES
Encounter Date: 11/2/2023    SCRIBE #1 NOTE: I, Joaquin Ferraro, am scribing for, and in the presence of,  Ashish Lucio MD. I have scribed the following portions of the note - Other sections scribed: HPI, ROS, PE, MDM.     History     Chief Complaint   Patient presents with    Transfer     AIRMED transfer from [facility] for open l eft humeral fx after falling from standing on a 5 gallon bucket. Transferred for ortho     A 54 y/o male with a history of sleep apnea presents to [facility] ED via EMS as a transfer from [facility] after losing his balance and falling off of a 5 gallon bucket that he was standing on while working outside of his house. Patient sustained an open fracture and has complain of left upper extremity pain secondary to the fall. Patient notes that he has had new onset of tingling in his left upper extremity since arriving here at [facility]. Patient had an allergic reaction to morphine at the prior facility, and patient received 2 of ancef at the last hospital as well. Patient reports that his tetanus is up to date from 2 years ago. Patient denies head pain, loss of consciousness, hip pain, and numbness.     The history is provided by the patient, the EMS personnel and medical records.     Review of patient's allergies indicates:   Allergen Reactions    Levaquin [levofloxacin] Rash    Opioids - morphine analogues Rash     History reviewed. No pertinent past medical history.  No past surgical history on file.  History reviewed. No pertinent family history.     Review of Systems   Constitutional:  Negative for activity change, chills, diaphoresis, fatigue and fever.   HENT:  Negative for congestion, ear pain, sinus pain and sore throat.    Eyes:  Negative for visual disturbance.   Respiratory:  Negative for cough, shortness of breath, wheezing and stridor.    Cardiovascular:  Negative for chest pain, palpitations and leg swelling.   Gastrointestinal:  Negative for abdominal pain, constipation,  diarrhea, nausea, rectal pain and vomiting.   Genitourinary:  Negative for dysuria and hematuria.   Musculoskeletal:  Negative for arthralgias, back pain and myalgias.        Left upper extremity pain/wound   Skin:  Negative for rash.   Neurological:  Negative for dizziness, syncope, weakness, numbness and headaches.   All other systems reviewed and are negative.      Physical Exam     Initial Vitals [11/02/23 1856]   BP Pulse Resp Temp SpO2   (!) 145/80 91 16 99.3 °F (37.4 °C) 99 %      MAP       --         Physical Exam    Nursing note and vitals reviewed.  Constitutional: No distress.   HENT:   Head: Normocephalic and atraumatic.   Eyes: EOM are normal.   Neck: Trachea normal. Neck supple.   Normal range of motion.  Cardiovascular:  Normal rate and regular rhythm.           No murmur heard.  Pulses:       Radial pulses are 2+ on the left side.   Patient has a strong left radial pulse   Pulmonary/Chest: Breath sounds normal. No respiratory distress.   Abdominal: Abdomen is soft. Bowel sounds are normal. He exhibits no distension. There is no abdominal tenderness. There is no rebound and no guarding.   Musculoskeletal:         General: Normal range of motion.      Cervical back: Normal range of motion and neck supple.      Lumbar back: Normal.      Comments: Left upper extremity in a long arm posterior splint.  Patient has intact sensation to all digits of the left hand, patient has a good left hand .     Neurological: He is alert and oriented to person, place, and time. He has normal strength.   5/5 strength left hand . Intact sensation to left hand.    Skin: Skin is warm and dry. No rash noted.   Psychiatric: He has a normal mood and affect.       ED Course   Procedures  Labs Reviewed - No data to display  EKG Readings: (Independently Interpreted)   Initial Reading: No STEMI. Rhythm: Normal Sinus Rhythm. Heart Rate: 91. Ectopy: No Ectopy. Conduction: Normal. ST Segments: Normal ST Segments. T Waves:  Normal. Axis: Normal.       Imaging Results              X-Ray Chest AP Portable (In process)                   X-Rays:   Independently Interpreted Readings:   Other Readings:  No acute changes seen on chest x-ray    Medications   HYDROmorphone (PF) injection 1 mg (1 mg Intravenous Given 11/2/23 1954)     Medical Decision Making  Differential diagnosis includes but is not limited to fracture, open fracture,  dislocation, open dislocation    Amount and/or Complexity of Data Reviewed  Labs: ordered.     Details: Labs were reviewed from transferring facility, all labs are stable.  Radiology: ordered.     Details: X-rays from the transferring facility were reviewed  Discussion of management or test interpretation with external provider(s): Dr. Willard, orthopedist on-call reports to admit to Trauma Service, NPO after midnight for surgery tomorrow, continue Ancef.    Risk  Prescription drug management.            Scribe Attestation:   Scribe #1: I performed the above scribed service and the documentation accurately describes the services I performed. I attest to the accuracy of the note.    Attending Attestation:           Physician Attestation for Scribe:  Physician Attestation Statement for Scribe #1: I, Ashish Lucio MD, reviewed documentation, as scribed by Joaquin Ferraro in my presence, and it is both accurate and complete.           ED Course as of 11/02/23 2002   u Nov 02, 2023 1953 Dr. Willard, orthopedics, admit to Trauma Service, Ancef q.6, NPO after midnight [KG]   1959 Trauma on-call, will come to eval [KG]      ED Course User Index  [KG] Ashish Lucio MD                    Clinical Impression:   Final diagnoses:  [Z01.818] Preop testing  [S53.115A, S51.002A] Open anterior dislocation of left elbow, initial encounter (Primary)        ED Disposition Condition    Admit Stable                Ashish Lucio MD  11/04/23 0014

## 2023-11-03 NOTE — PROGRESS NOTES
"   Trauma Surgery   Progress Note  Admit Date: 11/2/2023  HD#1  POD#* No surgery date entered *    Subjective:   Interval history:  Patient is a 53 year old male transferred from [facility] following a fall onto left arm resulting in an open elbow fracture dislocation and injury to branches of the brachial artery. He reports good pain control overnight. He reports numbness/tingling to left thumb and index finger. H&H stable this AM.    Home Meds: No current outpatient medications   Scheduled Meds:   acetaminophen  650 mg Oral Q4H    ceFAZolin (ANCEF) IVPB  2 g Intravenous Q8H    docusate sodium  100 mg Oral BID    enoxparin  40 mg Subcutaneous Q12H (prophylaxis, 0900/2100)    gabapentin  300 mg Oral TID    methocarbamoL  500 mg Oral Q8H    mupirocin   Nasal BID     Continuous Infusions:   lactated ringers 125 mL/hr at 11/02/23 2247     PRN Meds:HYDROmorphone, magnesium hydroxide 400 mg/5 ml, melatonin, oxyCODONE, oxyCODONE     Objective:     VITAL SIGNS: 24 HR MIN & MAX LAST   Temp  Min: 98.4 °F (36.9 °C)  Max: 99.3 °F (37.4 °C)  98.6 °F (37 °C)   BP  Min: 115/79  Max: 166/81  (!) 149/67    Pulse  Min: 77  Max: 91  81    Resp  Min: 13  Max: 20  18    SpO2  Min: 96 %  Max: 99 %  98 %      HT: 5' 9" (175.3 cm)  WT: 90.6 kg (199 lb 11.8 oz)  BMI: 29.5     Intake/output:  Intake/Output - Last 3 Shifts       None          No intake or output data in the 24 hours ending 11/03/23 0601      Lines/drains/airway:       Peripheral IV - Single Lumen 11/02/23 2056 20 G Right Antecubital (Active)   Site Assessment Clean;Dry;Intact;No redness;No swelling 11/03/23 0309   Extremity Assessment Distal to IV No abnormal discoloration 11/02/23 2247   Line Status Infusing 11/03/23 0309   Dressing Status Clean;Dry;Intact 11/03/23 0309   Dressing Intervention Integrity maintained 11/03/23 0309   Number of days: 0            Peripheral IV - Single Lumen 11/02/23 2056 Anterior;Proximal;Right Forearm (Active)   Site Assessment " "Clean;Intact;Dry;No redness;No swelling 11/03/23 0309   Extremity Assessment Distal to IV No abnormal discoloration 11/02/23 2247   Line Status Capped 11/03/23 0309   Dressing Status Clean;Dry;Intact 11/03/23 0309   Dressing Intervention Integrity maintained 11/03/23 0309   Number of days: 0       Physical examination:  Gen: NAD, AAOx3, answering questions appropriately  HEENT: Normocephalic, atraumatic  CV: RR  Resp: NWOB  Abd: S/NT/ND  Msk: moving all extremities spontaneously and purposefully. Pain with movement of LUE.  Neuro: CN II-XII grossly intact  Skin/wounds: Dressing and ACE in place to LUE with serosanguinous drainage noted.     Labs:  Renal:  Recent Labs     11/02/23 2102   BUN 16.4   CREATININE 0.90     Recent Labs     11/02/23 2102   LACTIC 1.5     FEN/GI:  Recent Labs     11/02/23 2102      K 4.2   CO2 27   CALCIUM 8.4   ALBUMIN 3.7   BILITOT 0.7   AST 23   ALKPHOS 49   ALT 30     Heme:  Recent Labs     11/02/23 2102 11/03/23 0527   HGB 13.0* 12.1*   HCT 37.5* 34.4*    149     ID:  Recent Labs     11/02/23 2102 11/03/23 0527   WBC 16.08* 12.73*     CBG:  Recent Labs     11/02/23 2102   GLUCOSE 117*      No results for input(s): "POCTGLUCOSE" in the last 72 hours.   Cardiovascular:  No results for input(s): "TROPONINI", "CKTOTAL", "CKMB", "BNP" in the last 168 hours.  I have reviewed all pertinent lab results within the past 24 hours.    Imaging:  CT Arm Elbow Without Contrast Left         X-Ray Chest AP Portable   Final Result      NO ACUTE CARDIOPULMONARY PROCESS IDENTIFIED.         Electronically signed by: Sumit Vasquez   Date:    11/02/2023   Time:    20:19      Xray Previous   Final Result      Xray Previous   Final Result      Xray Previous   Final Result      CT Previous   Final Result         I have reviewed all pertinent imaging results/findings within the past 24 hours.    Problems list:  Active Problem List with Overview Notes    Diagnosis Date Noted    Dislocation of " left elbow 11/03/2023    Open displaced fracture of neck of left radius 11/03/2023        Assessment & Plan:   Patient awake and alert with wife at bedside. He reports overall good pain control overnight. He is able to touch thumb to fingertip in all 5 fingers of left hand. He can spread fingers and make good fist. He does report some numbness/tingling to left thumb and index finger. +2 radial pulse to LUE with cap refill < 3 sec.     Consults:   Orthopedic surgery   Therapy:  Physical Therapy when able  Occupational Therapy when able Weight bearing status:   RUE: WBAT  LUE: NWB  RLE: WBAT  LLE: WBAT Precautions:  Standard   Seizure prophylaxis:  Not indicated. VTE prophylaxis:     Prophylactic Lovenox 40mg BID  GI prophylaxis:  Not indicated. Tolerating ordered diet.   Outpatient follow up:  Orthopedic surgery Disposition:  Pending surgery with Orthopedics.     - NPO for OR   - Daily labs  - MM pain control  - Ancef for ppx  - IS  - Therapy as above  - VTE prevention as above   - OR today with Orthopedics     Sofia Sanchez, AGACNP-BC, FNP-BC

## 2023-11-03 NOTE — NURSING
Nurses Note -- 4 Eyes      11/3/2023   1:03 AM      Skin assessed during: Admit      [] No Altered Skin Integrity Present    []Prevention Measures Documented      [x] Yes- Altered Skin Integrity Present or Discovered   [] LDA Added if Not in Epic (Describe Wound)   [x] New Altered Skin Integrity was Present on Admit and Documented in LDA   [] Wound Image Taken    Wound Care Consulted? No, going for surgery in AM    Attending Nurse:  Virginia Mcdowell RN/Staff Member:  Nicole

## 2023-11-04 ENCOUNTER — ANESTHESIA EVENT (OUTPATIENT)
Dept: SURGERY | Facility: HOSPITAL | Age: 53
DRG: 502 | End: 2023-11-04
Payer: COMMERCIAL

## 2023-11-04 LAB
ALBUMIN SERPL-MCNC: 2.9 G/DL (ref 3.5–5)
ALBUMIN/GLOB SERPL: 1.3 RATIO (ref 1.1–2)
ALP SERPL-CCNC: 36 UNIT/L (ref 40–150)
ALT SERPL-CCNC: 18 UNIT/L (ref 0–55)
AST SERPL-CCNC: 28 UNIT/L (ref 5–34)
BASOPHILS # BLD AUTO: 0.01 X10(3)/MCL
BASOPHILS NFR BLD AUTO: 0.1 %
BILIRUB SERPL-MCNC: 0.6 MG/DL
BUN SERPL-MCNC: 9.3 MG/DL (ref 8.4–25.7)
CALCIUM SERPL-MCNC: 8 MG/DL (ref 8.4–10.2)
CHLORIDE SERPL-SCNC: 105 MMOL/L (ref 98–107)
CO2 SERPL-SCNC: 26 MMOL/L (ref 22–29)
CREAT SERPL-MCNC: 0.76 MG/DL (ref 0.73–1.18)
EOSINOPHIL # BLD AUTO: 0.01 X10(3)/MCL (ref 0–0.9)
EOSINOPHIL NFR BLD AUTO: 0.1 %
ERYTHROCYTE [DISTWIDTH] IN BLOOD BY AUTOMATED COUNT: 12.5 % (ref 11.5–17)
GFR SERPLBLD CREATININE-BSD FMLA CKD-EPI: >60 MLS/MIN/1.73/M2
GLOBULIN SER-MCNC: 2.3 GM/DL (ref 2.4–3.5)
GLUCOSE SERPL-MCNC: 121 MG/DL (ref 74–100)
HCT VFR BLD AUTO: 30.2 % (ref 42–52)
HGB BLD-MCNC: 10.5 G/DL (ref 14–18)
IMM GRANULOCYTES # BLD AUTO: 0.05 X10(3)/MCL (ref 0–0.04)
IMM GRANULOCYTES NFR BLD AUTO: 0.4 %
LYMPHOCYTES # BLD AUTO: 1.11 X10(3)/MCL (ref 0.6–4.6)
LYMPHOCYTES NFR BLD AUTO: 8.5 %
MCH RBC QN AUTO: 30.8 PG (ref 27–31)
MCHC RBC AUTO-ENTMCNC: 34.8 G/DL (ref 33–36)
MCV RBC AUTO: 88.6 FL (ref 80–94)
MONOCYTES # BLD AUTO: 1.87 X10(3)/MCL (ref 0.1–1.3)
MONOCYTES NFR BLD AUTO: 14.4 %
NEUTROPHILS # BLD AUTO: 9.94 X10(3)/MCL (ref 2.1–9.2)
NEUTROPHILS NFR BLD AUTO: 76.5 %
NRBC BLD AUTO-RTO: 0 %
PLATELET # BLD AUTO: 143 X10(3)/MCL (ref 130–400)
PMV BLD AUTO: 11 FL (ref 7.4–10.4)
POTASSIUM SERPL-SCNC: 4.1 MMOL/L (ref 3.5–5.1)
PROT SERPL-MCNC: 5.2 GM/DL (ref 6.4–8.3)
RBC # BLD AUTO: 3.41 X10(6)/MCL (ref 4.7–6.1)
SODIUM SERPL-SCNC: 138 MMOL/L (ref 136–145)
WBC # SPEC AUTO: 12.99 X10(3)/MCL (ref 4.5–11.5)

## 2023-11-04 PROCEDURE — 63600175 PHARM REV CODE 636 W HCPCS: Performed by: PHYSICIAN ASSISTANT

## 2023-11-04 PROCEDURE — 25000003 PHARM REV CODE 250: Performed by: NURSE PRACTITIONER

## 2023-11-04 PROCEDURE — 85025 COMPLETE CBC W/AUTO DIFF WBC: CPT | Performed by: NURSE PRACTITIONER

## 2023-11-04 PROCEDURE — 63600175 PHARM REV CODE 636 W HCPCS: Performed by: NURSE PRACTITIONER

## 2023-11-04 PROCEDURE — 80053 COMPREHEN METABOLIC PANEL: CPT | Performed by: NURSE PRACTITIONER

## 2023-11-04 PROCEDURE — 11000001 HC ACUTE MED/SURG PRIVATE ROOM

## 2023-11-04 PROCEDURE — 25000003 PHARM REV CODE 250: Performed by: PHYSICIAN ASSISTANT

## 2023-11-04 RX ORDER — ENOXAPARIN SODIUM 100 MG/ML
40 INJECTION SUBCUTANEOUS EVERY 24 HOURS
Status: DISCONTINUED | OUTPATIENT
Start: 2023-11-05 | End: 2023-11-07 | Stop reason: HOSPADM

## 2023-11-04 RX ADMIN — MUPIROCIN: 20 OINTMENT TOPICAL at 09:11

## 2023-11-04 RX ADMIN — KETOROLAC TROMETHAMINE 15 MG: 30 INJECTION, SOLUTION INTRAMUSCULAR; INTRAVENOUS at 05:11

## 2023-11-04 RX ADMIN — ENOXAPARIN SODIUM 40 MG: 40 INJECTION SUBCUTANEOUS at 09:11

## 2023-11-04 RX ADMIN — GABAPENTIN 300 MG: 300 CAPSULE ORAL at 09:11

## 2023-11-04 RX ADMIN — DOCUSATE SODIUM 100 MG: 100 CAPSULE, LIQUID FILLED ORAL at 08:11

## 2023-11-04 RX ADMIN — KETOROLAC TROMETHAMINE 15 MG: 30 INJECTION, SOLUTION INTRAMUSCULAR; INTRAVENOUS at 11:11

## 2023-11-04 RX ADMIN — CEFTRIAXONE SODIUM 2 G: 2 INJECTION, POWDER, FOR SOLUTION INTRAMUSCULAR; INTRAVENOUS at 03:11

## 2023-11-04 RX ADMIN — OXYCODONE HYDROCHLORIDE 5 MG: 5 TABLET ORAL at 05:11

## 2023-11-04 RX ADMIN — SODIUM CHLORIDE, POTASSIUM CHLORIDE, SODIUM LACTATE AND CALCIUM CHLORIDE: 600; 310; 30; 20 INJECTION, SOLUTION INTRAVENOUS at 03:11

## 2023-11-04 RX ADMIN — POLYETHYLENE GLYCOL 3350 17 G: 17 POWDER, FOR SOLUTION ORAL at 09:11

## 2023-11-04 RX ADMIN — HYDROMORPHONE HYDROCHLORIDE 1 MG: 2 INJECTION INTRAMUSCULAR; INTRAVENOUS; SUBCUTANEOUS at 08:11

## 2023-11-04 RX ADMIN — SODIUM CHLORIDE, POTASSIUM CHLORIDE, SODIUM LACTATE AND CALCIUM CHLORIDE: 600; 310; 30; 20 INJECTION, SOLUTION INTRAVENOUS at 09:11

## 2023-11-04 RX ADMIN — Medication 6 MG: at 08:11

## 2023-11-04 RX ADMIN — CEFTRIAXONE SODIUM 2 G: 2 INJECTION, POWDER, FOR SOLUTION INTRAMUSCULAR; INTRAVENOUS at 02:11

## 2023-11-04 RX ADMIN — ACETAMINOPHEN 650 MG: 325 TABLET, FILM COATED ORAL at 03:11

## 2023-11-04 RX ADMIN — METHOCARBAMOL 500 MG: 500 TABLET ORAL at 05:11

## 2023-11-04 RX ADMIN — ACETAMINOPHEN 650 MG: 325 TABLET, FILM COATED ORAL at 02:11

## 2023-11-04 RX ADMIN — ACETAMINOPHEN 650 MG: 325 TABLET, FILM COATED ORAL at 09:11

## 2023-11-04 RX ADMIN — DOCUSATE SODIUM 100 MG: 100 CAPSULE, LIQUID FILLED ORAL at 09:11

## 2023-11-04 RX ADMIN — ACETAMINOPHEN 650 MG: 325 TABLET, FILM COATED ORAL at 05:11

## 2023-11-04 RX ADMIN — METHOCARBAMOL 500 MG: 500 TABLET ORAL at 08:11

## 2023-11-04 RX ADMIN — OXYCODONE HYDROCHLORIDE 10 MG: 10 TABLET ORAL at 07:11

## 2023-11-04 RX ADMIN — GABAPENTIN 300 MG: 300 CAPSULE ORAL at 02:11

## 2023-11-04 RX ADMIN — GABAPENTIN 300 MG: 300 CAPSULE ORAL at 08:11

## 2023-11-04 RX ADMIN — MUPIROCIN: 20 OINTMENT TOPICAL at 08:11

## 2023-11-04 RX ADMIN — METHOCARBAMOL 500 MG: 500 TABLET ORAL at 02:11

## 2023-11-04 NOTE — ANESTHESIA PREPROCEDURE EVALUATION
11/04/2023  Fabio Mcclendon is a 53 y.o., male admitted as a level 2 trauma after falling from a 5 gal bucket and sustaining left open elbow fracture dislocation.  Patient has postop day 1 status post ORIF of left arm/elbow (tolerated anesthesia--see below).  Patient presents today for I&D left upper extremity.  EKG normal sinus rhythm        Pre-op Assessment    I have reviewed the Patient Summary Reports.    I have reviewed the NPO Status.   I have reviewed the Medications.     Review of Systems  Anesthesia Hx:   Denies Personal Hx of Anesthesia complications.   Social:  Non-Smoker    Hematology/Oncology:         -- Anemia:   Cardiovascular:  Functional Capacity good / => 4 METS        Physical Exam  General: Well nourished, Cooperative, Alert and Oriented    Airway:  Mallampati: II   Mouth Opening: Normal  TM Distance: Normal  Tongue: Normal  Neck ROM: Normal ROM    Dental:  Intact    Chest/Lungs:  Clear to auscultation, Normal Respiratory Rate    Heart:  Rate: Normal  Rhythm: Regular Rhythm        Anesthesia Plan  Type of Anesthesia, risks & benefits discussed:    Anesthesia Type: Gen Supraglottic Airway  Intra-op Monitoring Plan: Standard ASA Monitors  Post Op Pain Control Plan: multimodal analgesia and IV/PO Opioids PRN  Induction:  IV  Airway Plan: Direct  Informed Consent: Informed consent signed with the Patient and all parties understand the risks and agree with anesthesia plan.  All questions answered.   ASA Score: 2  Day of Surgery Review of History & Physical: H&P Update referred to the surgeon/provider.    Ready For Surgery From Anesthesia Perspective.     .

## 2023-11-04 NOTE — PLAN OF CARE
Problem: Adult Inpatient Plan of Care  Goal: Plan of Care Review  11/4/2023 1115 by Maria Del Rosario Felix RN  Outcome: Ongoing, Progressing  11/4/2023 1115 by Maria Del Rosario Felix RN  Outcome: Ongoing, Not Progressing  Goal: Patient-Specific Goal (Individualized)  11/4/2023 1115 by Maria Del Rosario Felix RN  Outcome: Ongoing, Progressing  11/4/2023 1115 by Maria Del Rosario Felix RN  Outcome: Ongoing, Not Progressing  Goal: Absence of Hospital-Acquired Illness or Injury  11/4/2023 1115 by Maria Del Rosario Felix RN  Outcome: Ongoing, Progressing  11/4/2023 1115 by Maria Del Rosario Felix RN  Outcome: Ongoing, Not Progressing  Goal: Optimal Comfort and Wellbeing  11/4/2023 1115 by Maria Del Rosario Felix RN  Outcome: Ongoing, Progressing  11/4/2023 1115 by Maria Del Rosario Felix RN  Outcome: Ongoing, Not Progressing  Goal: Readiness for Transition of Care  11/4/2023 1115 by Maria Del Rosario Felix RN  Outcome: Ongoing, Progressing  11/4/2023 1115 by Maria Del Rosario Felix RN  Outcome: Ongoing, Not Progressing

## 2023-11-04 NOTE — PROGRESS NOTES
"   Trauma Surgery   Progress Note  Admit Date: 11/2/2023  HD#2  POD#1 Day Post-Op    Subjective:   Interval history:  Patient is a 53 year old male transferred from [facility] following a fall onto left arm resulting in an open elbow fracture dislocation and injury to branches of the brachial artery. He reports good pain control overnight. He reports numbness/tingling to left thumb and index finger. H&H stable this AM.    S/pI&D, exfix placement, medial collateral ligament repair and excision of radial head of L elbow with orthopedic surgery 11/3 - plans to return to OR Sunday for I&D of elbow and possible radial head replacement.    Tolerating diet, no nausea/vomiting  Denies fevers, chills  OOB  UOP 1350  No BM since admission  Endorses some numbness and tingling L thumb, index and middle finger    Home Meds: No current outpatient medications   Scheduled Meds:   acetaminophen  650 mg Oral Q4H    cefTRIAXone (ROCEPHIN) IVPB  2 g Intravenous Q12H    docusate sodium  100 mg Oral BID    enoxparin  40 mg Subcutaneous Q12H (prophylaxis, 0900/2100)    gabapentin  300 mg Oral TID    ketorolac  15 mg Intravenous Q6H    methocarbamoL  500 mg Oral Q8H    mupirocin   Nasal BID    polyethylene glycol  17 g Oral Daily     Continuous Infusions:   lactated ringers 125 mL/hr at 11/04/23 0309     PRN Meds:HYDROmorphone, magnesium hydroxide 400 mg/5 ml, melatonin, ondansetron, oxyCODONE, oxyCODONE     Objective:     VITAL SIGNS: 24 HR MIN & MAX LAST   Temp  Min: 97.6 °F (36.4 °C)  Max: 99.5 °F (37.5 °C)  98.5 °F (36.9 °C)   BP  Min: 113/67  Max: 174/83  113/67    Pulse  Min: 66  Max: 92  74    Resp  Min: 13  Max: 21  16    SpO2  Min: 95 %  Max: 100 %  97 %      HT: 5' 9" (175.3 cm)  WT: 90.6 kg (199 lb 11.8 oz)  BMI: 29.5     Intake/output:  Intake/Output - Last 3 Shifts         11/02 0700 11/03 0659 11/03 0700 11/04 0659    I.V. (mL/kg) 895.3 (9.9) 258.5 (2.9)    IV Piggyback 48.6 1746.5    Total Intake(mL/kg) 944 (10.4) 2005 (22.1) "    Urine (mL/kg/hr)  1350 (0.6)    Total Output  1350    Net +944 +655                  Intake/Output Summary (Last 24 hours) at 11/4/2023 0548  Last data filed at 11/4/2023 0339  Gross per 24 hour   Intake 2948.99 ml   Output 1350 ml   Net 1598.99 ml         Lines/drains/airway:       Peripheral IV - Single Lumen 11/02/23 2056 20 G Right Antecubital (Active)   Site Assessment Clean;Dry;Intact;No redness;No swelling 11/03/23 0309   Extremity Assessment Distal to IV No abnormal discoloration 11/02/23 2247   Line Status Infusing 11/03/23 0309   Dressing Status Clean;Dry;Intact 11/03/23 0309   Dressing Intervention Integrity maintained 11/03/23 0309   Number of days: 0            Peripheral IV - Single Lumen 11/02/23 2056 Anterior;Proximal;Right Forearm (Active)   Site Assessment Clean;Intact;Dry;No redness;No swelling 11/03/23 0309   Extremity Assessment Distal to IV No abnormal discoloration 11/02/23 2247   Line Status Capped 11/03/23 0309   Dressing Status Clean;Dry;Intact 11/03/23 0309   Dressing Intervention Integrity maintained 11/03/23 0309   Number of days: 0       Physical examination:  Gen: NAD, AAOx3, answering questions appropriately  HEENT: Normocephalic, atraumatic  CV: RR  Resp: NWOB  Abd: S/NT/ND  Msk: moving all extremities spontaneously and purposefully. Pain with movement of LUE.  Neuro: CN II-XII grossly intact  Skin/wounds: Dressing and ACE in place to LUE with sling    Labs:  Renal:  Recent Labs     11/02/23 2102 11/03/23 0527   BUN 16.4 12.6   CREATININE 0.90 0.80     Recent Labs     11/02/23 2102   LACTIC 1.5     FEN/GI:  Recent Labs     11/02/23 2102 11/03/23 0527    139   K 4.2 3.9   CO2 27 26   CALCIUM 8.4 8.2*   MG  --  1.70   PHOS  --  3.6   ALBUMIN 3.7 3.2*   BILITOT 0.7 0.9   AST 23 21   ALKPHOS 49 38*   ALT 30 23     Heme:  Recent Labs     11/02/23 2102 11/03/23 0527   HGB 13.0* 12.1*   HCT 37.5* 34.4*    149     ID:  Recent Labs     11/02/23 2102 11/03/23 0527  "  WBC 16.08* 12.73*     CBG:  Recent Labs     11/02/23  2102 11/03/23  0527   GLUCOSE 117* 106*      No results for input(s): "POCTGLUCOSE" in the last 72 hours.   Cardiovascular:  No results for input(s): "TROPONINI", "CKTOTAL", "CKMB", "BNP" in the last 168 hours.  I have reviewed all pertinent lab results within the past 24 hours.    Imaging:  X-Ray Elbow 2 Views Left   Final Result      Expected postoperative changes.         Electronically signed by: Lg Bell   Date:    11/03/2023   Time:    15:34      SURG FL Surgery Fluoro Usage   Final Result      CT Arm Elbow Without Contrast Left   Final Result      X-Ray Chest AP Portable   Final Result      NO ACUTE CARDIOPULMONARY PROCESS IDENTIFIED.         Electronically signed by: Sumit Vasquez   Date:    11/02/2023   Time:    20:19      Xray Previous   Final Result      Xray Previous   Final Result      Xray Previous   Final Result      CT Previous   Final Result         I have reviewed all pertinent imaging results/findings within the past 24 hours.    Problems list:  Active Problem List with Overview Notes    Diagnosis Date Noted    Dislocation of left elbow 11/03/2023    Type III open displaced fracture of neck of left radius 11/03/2023        Assessment & Plan:   Patient is a 53 year old male transferred from [facility] following a fall onto left arm resulting in an open elbow fracture dislocation and injury to branches of the brachial artery.  S/p I&D, exfix placement, medial collateral ligament repair and excision of radial head of L elbow with orthopedic surgery 11/3 - plans to return to OR Sunday for I&D of elbow and possible radial head replacement     Consults:   Orthopedic surgery   Therapy:  Physical Therapy when able  Occupational Therapy when able Weight bearing status:   RUE: WBAT  LUE: NWB  RLE: WBAT  LLE: WBAT Precautions:  Standard   Seizure prophylaxis:  Not indicated. VTE prophylaxis:     Prophylactic Lovenox 40mg BID  GI prophylaxis:  Not " indicated. Tolerating ordered diet.   Outpatient follow up:  Orthopedic surgery Disposition:  Pending surgery with Orthopedics.     - reg diet, NPO @[place] for OR tomorrow w/ Ortho  - Daily labs  - plans to return to OR Sunday 11/5 for I&D of elbow and possible radial head replacement  - MM pain control  - ceftriaxone until 24 hrs after radial head replacement  - keep splint /d/i  - IS  - Therapy as above  - VTE prevention as above   - isolated orthopedic injury - will transfer to orthopedic surgery service, Dr. Lucian Vieyra accepted.     Maryann Fox  [facility] General Surgery PGY1

## 2023-11-04 NOTE — PROGRESS NOTES
TERTIARY TRAUMA SURVEY (TTS)    List Injuries Identified to Date:   1. Type III open displaced fracture of neck of left radius  2. Dislocation of left elbow    [x]Problems list reviewed  List Operations and Procedures:   1. I&D, exfix placement, medial collateral ligament repair and excision of radial head of L elbow with orthopedic surgery 11/3    No past surgical history on file.    Incidental findings:   1. None found    Past Medical History:   1. Suspected LEA    Active Ambulatory Problems     Diagnosis Date Noted    No Active Ambulatory Problems     Resolved Ambulatory Problems     Diagnosis Date Noted    No Resolved Ambulatory Problems     No Additional Past Medical History     History reviewed. No pertinent past medical history.    Tertiary Physical Exam:     Physical Exam  Vitals and nursing note reviewed.   Constitutional:       Appearance: Normal appearance. He is normal weight.   HENT:      Head: Normocephalic and atraumatic.      Right Ear: External ear normal.      Left Ear: External ear normal.      Nose: Nose normal.      Mouth/Throat:      Mouth: Mucous membranes are moist.      Pharynx: Oropharynx is clear.   Eyes:      Extraocular Movements: Extraocular movements intact.      Conjunctiva/sclera: Conjunctivae normal.      Pupils: Pupils are equal, round, and reactive to light.   Cardiovascular:      Rate and Rhythm: Normal rate and regular rhythm.      Pulses: Normal pulses.      Heart sounds: Normal heart sounds.   Pulmonary:      Effort: Pulmonary effort is normal.      Breath sounds: Normal breath sounds.   Abdominal:      General: Abdomen is flat. Bowel sounds are normal.      Palpations: Abdomen is soft.   Genitourinary:     Penis: Normal.       Testes: Normal.      Rectum: Normal.   Musculoskeletal:      Cervical back: Normal range of motion and neck supple.      Comments: LUE wrapped in ace and in sling  RUE normal   Skin:     General: Skin is warm and dry.      Capillary Refill: Capillary  refill takes less than 2 seconds.   Neurological:      General: No focal deficit present.      Mental Status: He is alert and oriented to person, place, and time. Mental status is at baseline.   Psychiatric:         Mood and Affect: Mood normal.         Behavior: Behavior normal.         Thought Content: Thought content normal.         Judgment: Judgment normal.       Imaging Review:     Imaging Results              CT Arm Elbow Without Contrast Left (Final result)  Result time 11/03/23 08:29:56      Final result by Rocael Kinney MD (11/03/23 08:29:56)                   Narrative:    EXAMINATION  CT ARM ELBOW WITHOUT CONTRAST LEFT    CLINICAL HISTORY  Elbow dislocation, post reduction;preop; Anterior dislocation of left ulnohumeral joint, initial encounter    TECHNIQUE  Non-contrast helical-acquisition CT images of the left elbow were obtained.  Multiplanar reconstructions accomplished by a CT technologist at a separate workstation, pushed to PACS for physician review.    COMPARISON  Outside facility upper extremity CTA obtained earlier the same day.    FINDINGS  Images were reviewed in bone and soft tissue windows.    Exam quality: adequate for evaluation    Bones: There is grossly unchanged appearance of the comminuted, markedly displaced fracture involving the head and neck of left radius, with associated lateral displacement of the fracture fragments. There is disruption of the radiohumeral joint and proximal radioulnar joint. The included portions of the humerus and ulna are unremarkable. The ulnohumeral joint is intact. Soft tissues: A large soft tissue laceration is again seen at the anteromedial aspect of the left elbow joint, with further evaluation of the regional soft tissues and vascular structures precluded by non-contrast protocol.  There similar widespread soft tissue emphysema along the medial aspect of the proximal forearm.    IMPRESSION  1. Grossly unchanged appearance of open comminuted  fracture-dislocation involving the radial head/neck.  2. Remaining visualized osseous structures are without acute abnormality or significant interval change.  ==========    No significant discrepancy identified in relation to the teleradiology preliminary report.    RADIATION DOSE  Automated tube current modulation, weight-based exposure dosing, and/or iterative reconstruction technique utilized to reach lowest reasonably achievable exposure rate.    DLP: 624 mGy*cm      Electronically signed by: Rocael Kinney  Date:    11/03/2023  Time:    08:29                      Preliminary result by Rocael Kinney MD (11/02/23 21:42:25)                   Narrative:    START OF REPORT:  Technique: CT of the left elbow was performed without intravenous contrast.    Comparison: Correlation is with plain film study dated2023-11-02 15:08:00.    Clinical history: Elbow dislocation, post reduction, preop, Open anterior dislocation of left elbow.    Findings:  Upper arm: The visualized structures of the humerus appear unremarkable.  Elbow: There is a comminuted markedly displaced fracture of the head and neck of left radius with lateral displacement of the fracture fragments. The ulnohumeral joint is intact. There is disruption of the radiohumeral joint and proximal radioulnar joint.  Miscellaneous: A large soft tissue laceration is seen at the anteromedial aspect of the left elbow joint. There is also soft tissue emphysema along the medial aspect of the proximal forearm.      Impression:  1. There is a comminuted markedly displaced fracture of the head and neck of left radius with lateral displacement of the fracture fragments. The ulnohumeral joint is intact. There is disruption of the radiohumeral joint and proximal radioulnar joint.  2. Other details and findings as discussed above.                                         X-Ray Chest AP Portable (Final result)  Result time 11/02/23 20:19:18      Final result by Sumit Vasquez,  "MD (11/02/23 20:19:18)                   Impression:      NO ACUTE CARDIOPULMONARY PROCESS IDENTIFIED.      Electronically signed by: Sumit Vasquez  Date:    11/02/2023  Time:    20:19               Narrative:    EXAMINATION:  XR CHEST AP PORTABLE    CLINICAL HISTORY:  Preop;    TECHNIQUE:  One    COMPARISON:  None available    FINDINGS:  Cardiopericardial silhouette is within normal limits. Lungs are without dense focal or segmental consolidation, congestive process, pleural effusions or pneumothorax.                                       Lab Review:   CBC:  Recent Labs   Lab Result Units 11/02/23 2102 11/03/23 0527 11/04/23  0552   WBC x10(3)/mcL 16.08* 12.73* 12.99*   RBC x10(6)/mcL 4.24* 3.90* 3.41*   Hgb g/dL 13.0* 12.1* 10.5*   Hct % 37.5* 34.4* 30.2*   Platelet x10(3)/mcL 166 149 143   MCV fL 88.4 88.2 88.6   MCH pg 30.7 31.0 30.8   MCHC g/dL 34.7 35.2 34.8       CMP:  Recent Labs   Lab Result Units 11/02/23 2102 11/03/23 0527 11/04/23  0552   Calcium Level Total mg/dL 8.4 8.2* 8.0*   Albumin Level g/dL 3.7 3.2* 2.9*   Sodium Level mmol/L 139 139 138   Potassium Level mmol/L 4.2 3.9 4.1   Carbon Dioxide mmol/L 27 26 26   Blood Urea Nitrogen mg/dL 16.4 12.6 9.3   Creatinine mg/dL 0.90 0.80 0.76   Alkaline Phosphatase unit/L 49 38* 36*   Alanine Aminotransferase unit/L 30 23 18   Aspartate Aminotransferase unit/L 23 21 28   Bilirubin Total mg/dL 0.7 0.9 0.6       Troponin:  No results for input(s): "TROPONINI" in the last 2160 hours.    ETOH:  No results for input(s): "ETHANOL" in the last 72 hours.     Urine Drug Screen:  No results for input(s): "COCAINE", "OPIATE", "BARBITURATE", "AMPHETAMINE", "FENTANYL", "CANNABINOIDS", "MDMA" in the last 72 hours.    Invalid input(s): "BENZODIAZEPINE", "PHENCYCLIDINE"   Plan:   Patient is a 53 year old male transferred from [facility] following a fall onto left arm resulting in an open elbow fracture dislocation and injury to branches of the brachial artery.  S/p I&D, " exfix placement, medial collateral ligament repair and excision of radial head of L elbow with orthopedic surgery 11/3 - plans to return to OR Sunday for I&D of elbow and possible radial head replacement    No new injuries found on tertiary exam.     - reg diet, NPO @[place] for OR tomorrow w/ Ortho  - Daily labs  - plans to return to OR Sunday 11/5 for I&D of elbow and possible radial head replacement  - MM pain control  - ceftriaxone until 24 hrs after radial head replacement  - keep splint /d/i  - IS  - ppx Lov 40mg BID  - PTOT when appropriate  - isolated orthopedic injury - will transfer to orthopedic surgery service, Dr. Vieyra accepted    Mayrann Fox  [facility] General Surgery PGY1

## 2023-11-04 NOTE — PROGRESS NOTES
[facility] - 9th Floor Med Surg  Orthopedics  Progress Note    Patient Name: Fabio Mcclendon  MRN: [id number]  Admission Date: 11/2/2023  Hospital Length of Stay: 2 days  Attending Provider: Nacho Thompson Jr., *  Primary Care Provider: Kasia, Primary Doctor  Follow-up For: Procedure(s) (LRB):  INCISION AND DRAINAGE, UPPER EXTREMITY (Left)  ORIF, ELBOW (Left)    Post-Operative Day: 1 Day Post-Op  Subjective:     Principal Problem:Type III open displaced fracture of neck of left radius    Principal Orthopedic Problem: 1 Day Post-Op   Right elbow I&D with IJS placement and radial head prep for replacement    Interval History: Resting comfortably this morning. Pain is well controlled. Mild numbness and tingling in the median nerve distribution. IJS placed to vi elbow. Splinted this morning. No other acute complaints. Understands the severity of his injury and complications possible.     Review of patient's allergies indicates:   Allergen Reactions    Levaquin [levofloxacin] Rash    Opioids - morphine analogues Rash       Current Facility-Administered Medications   Medication    acetaminophen tablet 650 mg    cefTRIAXone (ROCEPHIN) 2 g in dextrose 5 % in water (D5W) 100 mL IVPB (MB+)    docusate sodium capsule 100 mg    enoxaparin injection 40 mg    gabapentin capsule 300 mg    HYDROmorphone (PF) injection 1 mg    ketorolac injection 15 mg    lactated ringers infusion    magnesium hydroxide 400 mg/5 ml suspension 2,400 mg    melatonin tablet 6 mg    methocarbamoL tablet 500 mg    mupirocin 2 % ointment    ondansetron injection 4 mg    oxyCODONE immediate release tablet 5 mg    oxyCODONE immediate release tablet Tab 10 mg    polyethylene glycol packet 17 g     Objective:     Vital Signs (Most Recent):  Temp: 98.2 °F (36.8 °C) (11/04/23 0808)  Pulse: 70 (11/04/23 0808)  Resp: 16 (11/04/23 0520)  BP: 129/62 (11/04/23 0808)  SpO2: 99 % (11/04/23 0808) Vital Signs (24h Range):  Temp:  [97.6 °F (36.4  "°C)-99.5 °F (37.5 °C)] 98.2 °F (36.8 °C)  Pulse:  [66-92] 70  Resp:  [13-21] 16  SpO2:  [95 %-100 %] 99 %  BP: (113-174)/() 129/62     Weight: 90.6 kg (199 lb 11.8 oz)  Height: 5' 9" (175.3 cm)  Body mass index is 29.5 kg/m².      Intake/Output Summary (Last 24 hours) at 11/4/2023 0819  Last data filed at 11/4/2023 0339  Gross per 24 hour   Intake 2005.01 ml   Output 1350 ml   Net 655.01 ml       Physical Exam:   Musculoskeletal:     Left upper extremity: Splint CDI without obvious drainage; compartments are soft and compressible; No elbow ROM, able to flex and extend all digits and wrist, moderate tenderness to palpation; AIN/PIN/Ulnar motor intact; SILT distally with mild numbness in tingling in thumb 1st and long finger;BCR distally; Radial pulse palpable        Diagnostic Findings:   Significant Labs:   Recent Lab Results         11/04/23  0552   11/03/23  0611   11/03/23  0527   11/02/23  2102        Albumin/Globulin Ratio 1.3     1.3   1.6       ABO and RH   O POS           Albumin 2.9     3.2   3.7       ALP 36     38   49       ALT 18     23   30       AST 28     21   23       Baso # 0.01     0.03   0.04       Basophil % 0.1     0.2   0.2       BILIRUBIN TOTAL 0.6     0.9   0.7       BUN 9.3     12.6   16.4       Calcium 8.0     8.2   8.4       Chloride 105     107   107       CO2 26     26   27       CPK       387       Creatinine 0.76     0.80   0.90       eGFR >60     >60   >60       Eos # 0.01     0.06   0.01       Eosinophil % 0.1     0.5   0.1       Globulin, Total 2.3     2.4   2.3       Glucose 121     106   117       Group & Rh     O POS         Hematocrit 30.2     34.4   37.5       Hemoglobin 10.5     12.1   13.0       Immature Grans (Abs) 0.05     0.04   0.07       Immature Granulocytes 0.4     0.3   0.4       Indirect Jostin GEL     NEG         Lactate, Darnell       1.5       Lymph # 1.11     2.00   0.81       LYMPH % 8.5     15.7   5.0       Magnesium      1.70         MCH 30.8     31.0   " 30.7       MCHC 34.8     35.2   34.7       MCV 88.6     88.2   88.4       Mono # 1.87     1.67   1.34       Mono % 14.4     13.1   8.3       MPV 11.0     10.6   10.5       Neut # 9.94     8.93   13.81       Neut % 76.5     70.2   86.0       nRBC 0.0     0.0   0.0       Phosphorus Level     3.6         Platelet Count 143     149   166       Potassium 4.1     3.9   4.2       PROTEIN TOTAL 5.2     5.6   6.0       RBC 3.41     3.90   4.24       RDW 12.5     12.7   12.4       Sodium 138     139   139       Specimen Outdate     11/06/2023 23:59         WBC 12.99     12.73   16.08                Significant Imaging: I have reviewed and interpreted all pertinent imaging results/findings.     Assessment/Plan:     Active Diagnoses:    Diagnosis Date Noted POA    PRINCIPAL PROBLEM:  Type III open displaced fracture of neck of left radius [S52.132C] 11/03/2023 Yes    Dislocation of left elbow [S53.105A] 11/03/2023 Yes      Problems Resolved During this Admission:   H&H with slight drop. Stable  Will need repeat I&D tomorrow with possible radial head arthoplasty at that time if not grossly contaminated.   Continue IV abx with rocephin due to level of wound contamination.   NWB. Splint. No elbow ROM at this time.   Continue multimodal pain control. Lovenox/DVT ppx per primary team.   Okay for diet progression today from ortho standpoint. NPO at midnight tonight for repeat I&D tomorrow.   Discussed severity of injury and possible permanent loss of motion to the elbow as well as infection risk with open injury and possible need for multiple surgeries. He is understanding of this, along with family.      The above findings, diagnostics, and treatment plan were discussed with Dr. Vieyra who is in agreement with the plan of care except as stated in additional documentation.      Tawana Dutton PA-C  Orthopedic Trauma Surgery  [facility]

## 2023-11-04 NOTE — OP NOTE
[facility]                       [address]                      [place] [address]    PATIENT NAME:      LATRICIA DONOVAN   YOB: 1970  CSN:               [id number]  MRN:               [id number]  ADMIT DATE:        11/02/2023 18:59:00  PHYSICIAN:         Marco Black MD                          OPERATIVE REPORT      DATE OF SURGERY:    11/03/2023 00:00:00    SURGEON:  Marco Black MD    PREOPERATIVE DIAGNOSIS:  Left grade 3 open radial head fracture, dislocation of   the left elbow.    POSTOPERATIVE DIAGNOSIS:  Left grade 3 open radial head fracture, dislocation of   the left elbow.    PROCEDURES:    1. Incision and drainage of open fracture including skin, subcutaneous tissue,   muscle and bone.  2. Application of a multiplanar unilateral external fixator, left elbow.  3. Repair of medial collateral ligaments of the left elbow using local tissue   repair.  4. Excision of radial head, left elbow.    ASSISTANT:  DAJUAN Doe necessary for a skilled set of hands to assist   with deep retraction as well as application of implant and layered closure.    IMPLANTS:  Skeletal Dynamics IJS system, 45 mm Axis Pin.    COMPLICATIONS:  None.    ESTIMATED BLOOD LOSS:  20 cc.    TOURNIQUET TIME:  60 minutes.    INDICATIONS FOR PROCEDURE:  The patient is a 53-year-old male who had a fall off   a bucket approximately 2 feet up.  He fell onto his outstretched left upper   extremity, sustained a fracture dislocation of the elbow.  He was seen at an   outside facility.  He was not evaluated by the orthopedic surgeon on call.  He   requested a transfer because he was uncomfortable managing the open elbow   dislocation.  The patient underwent local debridement in the ER and was   transferred over night to [facility].  He was admitted to   the Trauma team.  He is being on continuous IV antibiotics with Ancef since his    transfer.  The risks and benefits of treatment were discussed at length with the   patient.  He was noted to have some paresthesias in the median nerve   distribution.  However, he was neurovascular intact otherwise distally.  His   elbow was reduced in the ER at the outside facility and was placed into a   splint.  He was brought to the operating room today for irrigation, debridement   of his open dislocation with stabilization of his elbow.    PROCEDURE IN DETAIL:  After informed consent was obtained, the patient was met   in the preoperative holding area.  Site was marked.  He was taken to the   operating room, placed supine on the operating table.  General anesthesia was   induced.  All bony prominences were well padded.  Preoperative antibiotics were   given.  His left upper extremity had the splint removed.  His elbow was   completely unstable and immediately dislocated.  He had a large open laceration   to the antecubital fossa in a transverse fashion, approximately 20 cm in length.    The wound was prepped and draped in standard sterile fashion.  A time-out was   done indicating correct operative limb and procedure.  The tourniquet was   raised.  On evaluation of the traumatic wound, he did have some grass and debris   that was debrided superficially from the wound edges.  He was noted to have   fracture fragments from his radial head in the elbow joint.  This was debrided   using a rongeur and excisional debridement of the skin and subcutaneous tissue   and muscle was performed using a rongeur and a 15 blade.  No active bleeding was   noted.  His median nerve was identified across the midportion of this open   traumatic wound, it was stretched and contused, but was intact.  He had   avulsions off the medial epicondyle of his medial collateral ligaments.  The   debridement was performed with 6 L of normal saline as well as 1 L of Xperience   antimicrobial solution.  We then performed an approach to the  lateral aspect of   the elbow through an extensile posterior incision and carried to the   subcutaneous flap over to the lateral condyle.  We opened up the joint.  He had   a highly comminuted fracture of the radial head.  It was cut and excised.  We   then applied the internal joint stabilizing system to stabilize the elbow.  The   pin placed in the center of rotational axis of the lateral condyle.  The plate   was then applied to the proximal ulna with 3 screws.  The elbow joint was   reduced by my assistant.  The IJS was then attached.  He was put through a range   of motion.  The elbow joint remained concentrically reduced.  We were then able   to re-evaluate his wound along the medial aspect.  Once it was completely   debrided and all of the nonviable muscle and subcutaneous tissue was excised, we   were able to perform local repair of his medial collateral ligament using   figure-of-eight sutures with a #1 PDS.  We then able to repair portions of his   anterior capsule.  His biceps was determined to be intact.  His elbow still was   able to perform full range of motion.  We then performed a layered closure with   2-0 Monocryl and 2-0 nylon.  The posterior surgical approach was irrigated.  A   gram of vancomycin was placed in the elbow joint.  A layered closure was   performed with a #1 PDS, 2-0 Monocryl, and staples.  He was placed into a 90   degrees posterior long-arm splint, awakened, extubated, and taken to recovery in   stable condition.    POSTOPERATIVE PLAN:  He is going to be admitted to the floor.  He will be   nonweightbearing to left upper extremity.  We will escalate his antibiotics to   ceftriaxone 2 g.  He will need a repeat washout in 48 hours.  I have discussed   this plan with my partner, Dr. Lucian Vieyra, who is going to be available to   perform this procedure.  If his joint is clean at that time, we will plan for   radial head replacement.  His radial head measured 24 mm.  The cut will be    performed was for 0 mm neck.  If his wounds are not ready at that time, we will   plan to do a washout alone and come back early next week for radial head   replacement.        ______________________________  MD WOODY Miranda/TAMI  DD:  11/03/2023  Time:  02:18PM  DT:  11/03/2023  Time:  09:05PM  Job #:  [id number]      OPERATIVE REPORT

## 2023-11-05 ENCOUNTER — ANESTHESIA (OUTPATIENT)
Dept: SURGERY | Facility: HOSPITAL | Age: 53
DRG: 502 | End: 2023-11-05
Payer: COMMERCIAL

## 2023-11-05 LAB
ALBUMIN SERPL-MCNC: 2.7 G/DL (ref 3.5–5)
ALBUMIN/GLOB SERPL: 1.1 RATIO (ref 1.1–2)
ALP SERPL-CCNC: 41 UNIT/L (ref 40–150)
ALT SERPL-CCNC: 19 UNIT/L (ref 0–55)
AST SERPL-CCNC: 35 UNIT/L (ref 5–34)
BASOPHILS # BLD AUTO: 0.04 X10(3)/MCL
BASOPHILS NFR BLD AUTO: 0.4 %
BILIRUB SERPL-MCNC: 0.5 MG/DL
BUN SERPL-MCNC: 10.3 MG/DL (ref 8.4–25.7)
CALCIUM SERPL-MCNC: 7.7 MG/DL (ref 8.4–10.2)
CHLORIDE SERPL-SCNC: 97 MMOL/L (ref 98–107)
CO2 SERPL-SCNC: 25 MMOL/L (ref 22–29)
CREAT SERPL-MCNC: 0.78 MG/DL (ref 0.73–1.18)
EOSINOPHIL # BLD AUTO: 0.13 X10(3)/MCL (ref 0–0.9)
EOSINOPHIL NFR BLD AUTO: 1.2 %
ERYTHROCYTE [DISTWIDTH] IN BLOOD BY AUTOMATED COUNT: 12.4 % (ref 11.5–17)
GFR SERPLBLD CREATININE-BSD FMLA CKD-EPI: >60 MLS/MIN/1.73/M2
GLOBULIN SER-MCNC: 2.4 GM/DL (ref 2.4–3.5)
GLUCOSE SERPL-MCNC: 121 MG/DL (ref 74–100)
HCT VFR BLD AUTO: 28.7 % (ref 42–52)
HGB BLD-MCNC: 9.4 G/DL (ref 14–18)
IMM GRANULOCYTES # BLD AUTO: 0.04 X10(3)/MCL (ref 0–0.04)
IMM GRANULOCYTES NFR BLD AUTO: 0.4 %
LYMPHOCYTES # BLD AUTO: 1.2 X10(3)/MCL (ref 0.6–4.6)
LYMPHOCYTES NFR BLD AUTO: 11.3 %
MCH RBC QN AUTO: 30.2 PG (ref 27–31)
MCHC RBC AUTO-ENTMCNC: 32.8 G/DL (ref 33–36)
MCV RBC AUTO: 92.3 FL (ref 80–94)
MONOCYTES # BLD AUTO: 1.38 X10(3)/MCL (ref 0.1–1.3)
MONOCYTES NFR BLD AUTO: 13 %
NEUTROPHILS # BLD AUTO: 7.82 X10(3)/MCL (ref 2.1–9.2)
NEUTROPHILS NFR BLD AUTO: 73.7 %
NRBC BLD AUTO-RTO: 0 %
PLATELET # BLD AUTO: 161 X10(3)/MCL (ref 130–400)
PMV BLD AUTO: 10.8 FL (ref 7.4–10.4)
POTASSIUM SERPL-SCNC: 3.7 MMOL/L (ref 3.5–5.1)
PROT SERPL-MCNC: 5.1 GM/DL (ref 6.4–8.3)
RBC # BLD AUTO: 3.11 X10(6)/MCL (ref 4.7–6.1)
SODIUM SERPL-SCNC: 127 MMOL/L (ref 136–145)
WBC # SPEC AUTO: 10.61 X10(3)/MCL (ref 4.5–11.5)

## 2023-11-05 PROCEDURE — D9220A PRA ANESTHESIA: Mod: ANES,,, | Performed by: ANESTHESIOLOGY

## 2023-11-05 PROCEDURE — 63600175 PHARM REV CODE 636 W HCPCS: Performed by: ORTHOPAEDIC SURGERY

## 2023-11-05 PROCEDURE — 11000001 HC ACUTE MED/SURG PRIVATE ROOM

## 2023-11-05 PROCEDURE — 24365 RECONSTRUCT HEAD OF RADIUS: CPT | Mod: AS,58,LT, | Performed by: PHYSICIAN ASSISTANT

## 2023-11-05 PROCEDURE — 37000009 HC ANESTHESIA EA ADD 15 MINS: Performed by: ORTHOPAEDIC SURGERY

## 2023-11-05 PROCEDURE — D9220A PRA ANESTHESIA: ICD-10-PCS | Mod: ANES,,, | Performed by: ANESTHESIOLOGY

## 2023-11-05 PROCEDURE — 63600175 PHARM REV CODE 636 W HCPCS: Performed by: PHYSICIAN ASSISTANT

## 2023-11-05 PROCEDURE — 25000003 PHARM REV CODE 250: Performed by: PHYSICIAN ASSISTANT

## 2023-11-05 PROCEDURE — 20693 ADJMT/REVJ EXT FIXJ SYS ANES: CPT | Mod: 58,51,, | Performed by: ORTHOPAEDIC SURGERY

## 2023-11-05 PROCEDURE — 24365: ICD-10-PCS | Mod: 58,LT,, | Performed by: ORTHOPAEDIC SURGERY

## 2023-11-05 PROCEDURE — C1713 ANCHOR/SCREW BN/BN,TIS/BN: HCPCS | Performed by: ORTHOPAEDIC SURGERY

## 2023-11-05 PROCEDURE — 21400001 HC TELEMETRY ROOM

## 2023-11-05 PROCEDURE — 63600175 PHARM REV CODE 636 W HCPCS: Performed by: NURSE PRACTITIONER

## 2023-11-05 PROCEDURE — 25000003 PHARM REV CODE 250: Performed by: NURSE PRACTITIONER

## 2023-11-05 PROCEDURE — 36000705 HC OR TIME LEV I EA ADD 15 MIN: Performed by: ORTHOPAEDIC SURGERY

## 2023-11-05 PROCEDURE — 25000003 PHARM REV CODE 250

## 2023-11-05 PROCEDURE — 24365 RECONSTRUCT HEAD OF RADIUS: CPT | Mod: 58,LT,, | Performed by: ORTHOPAEDIC SURGERY

## 2023-11-05 PROCEDURE — D9220A PRA ANESTHESIA: ICD-10-PCS | Mod: CRNA,,,

## 2023-11-05 PROCEDURE — 36000704 HC OR TIME LEV I 1ST 15 MIN: Performed by: ORTHOPAEDIC SURGERY

## 2023-11-05 PROCEDURE — 63600175 PHARM REV CODE 636 W HCPCS

## 2023-11-05 PROCEDURE — D9220A PRA ANESTHESIA: Mod: CRNA,,,

## 2023-11-05 PROCEDURE — 71000033 HC RECOVERY, INTIAL HOUR: Performed by: ORTHOPAEDIC SURGERY

## 2023-11-05 PROCEDURE — 80053 COMPREHEN METABOLIC PANEL: CPT | Performed by: NURSE PRACTITIONER

## 2023-11-05 PROCEDURE — 20693 PR ADJUST EXTERN BONE FIX DEV W ANESTH: ICD-10-PCS | Mod: 58,51,, | Performed by: ORTHOPAEDIC SURGERY

## 2023-11-05 PROCEDURE — 27201423 OPTIME MED/SURG SUP & DEVICES STERILE SUPPLY: Performed by: ORTHOPAEDIC SURGERY

## 2023-11-05 PROCEDURE — 85025 COMPLETE CBC W/AUTO DIFF WBC: CPT | Performed by: NURSE PRACTITIONER

## 2023-11-05 PROCEDURE — 37000008 HC ANESTHESIA 1ST 15 MINUTES: Performed by: ORTHOPAEDIC SURGERY

## 2023-11-05 PROCEDURE — 24365: ICD-10-PCS | Mod: AS,58,LT, | Performed by: PHYSICIAN ASSISTANT

## 2023-11-05 RX ORDER — ONDANSETRON 2 MG/ML
4 INJECTION INTRAMUSCULAR; INTRAVENOUS DAILY PRN
Status: DISCONTINUED | OUTPATIENT
Start: 2023-11-05 | End: 2023-11-05 | Stop reason: HOSPADM

## 2023-11-05 RX ORDER — PROCHLORPERAZINE EDISYLATE 5 MG/ML
5 INJECTION INTRAMUSCULAR; INTRAVENOUS EVERY 30 MIN PRN
Status: DISCONTINUED | OUTPATIENT
Start: 2023-11-05 | End: 2023-11-05 | Stop reason: HOSPADM

## 2023-11-05 RX ORDER — HYDROMORPHONE HYDROCHLORIDE 2 MG/ML
0.4 INJECTION, SOLUTION INTRAMUSCULAR; INTRAVENOUS; SUBCUTANEOUS EVERY 5 MIN PRN
Status: DISCONTINUED | OUTPATIENT
Start: 2023-11-05 | End: 2023-11-05 | Stop reason: HOSPADM

## 2023-11-05 RX ORDER — VANCOMYCIN HYDROCHLORIDE 1 G/20ML
INJECTION, POWDER, LYOPHILIZED, FOR SOLUTION INTRAVENOUS
Status: DISCONTINUED | OUTPATIENT
Start: 2023-11-05 | End: 2023-11-05 | Stop reason: HOSPADM

## 2023-11-05 RX ORDER — DEXAMETHASONE SODIUM PHOSPHATE 4 MG/ML
INJECTION, SOLUTION INTRA-ARTICULAR; INTRALESIONAL; INTRAMUSCULAR; INTRAVENOUS; SOFT TISSUE
Status: DISCONTINUED | OUTPATIENT
Start: 2023-11-05 | End: 2023-11-05

## 2023-11-05 RX ORDER — MEPERIDINE HYDROCHLORIDE 25 MG/ML
12.5 INJECTION INTRAMUSCULAR; INTRAVENOUS; SUBCUTANEOUS EVERY 10 MIN PRN
Status: DISCONTINUED | OUTPATIENT
Start: 2023-11-05 | End: 2023-11-05 | Stop reason: HOSPADM

## 2023-11-05 RX ORDER — IPRATROPIUM BROMIDE AND ALBUTEROL SULFATE 2.5; .5 MG/3ML; MG/3ML
3 SOLUTION RESPIRATORY (INHALATION)
Status: DISCONTINUED | OUTPATIENT
Start: 2023-11-05 | End: 2023-11-05 | Stop reason: HOSPADM

## 2023-11-05 RX ORDER — HYDROMORPHONE HYDROCHLORIDE 2 MG/ML
0.2 INJECTION, SOLUTION INTRAMUSCULAR; INTRAVENOUS; SUBCUTANEOUS EVERY 5 MIN PRN
Status: DISCONTINUED | OUTPATIENT
Start: 2023-11-05 | End: 2023-11-05 | Stop reason: HOSPADM

## 2023-11-05 RX ORDER — PHENYLEPHRINE HYDROCHLORIDE 10 MG/ML
INJECTION INTRAVENOUS
Status: DISCONTINUED | OUTPATIENT
Start: 2023-11-05 | End: 2023-11-05

## 2023-11-05 RX ORDER — SODIUM CHLORIDE, SODIUM GLUCONATE, SODIUM ACETATE, POTASSIUM CHLORIDE AND MAGNESIUM CHLORIDE 30; 37; 368; 526; 502 MG/100ML; MG/100ML; MG/100ML; MG/100ML; MG/100ML
INJECTION, SOLUTION INTRAVENOUS CONTINUOUS
Status: CANCELLED | OUTPATIENT
Start: 2023-11-05 | End: 2023-12-05

## 2023-11-05 RX ORDER — PROPOFOL 10 MG/ML
INJECTION, EMULSION INTRAVENOUS
Status: DISCONTINUED | OUTPATIENT
Start: 2023-11-05 | End: 2023-11-05

## 2023-11-05 RX ORDER — ONDANSETRON 4 MG/1
8 TABLET, ORALLY DISINTEGRATING ORAL EVERY 6 HOURS PRN
Status: CANCELLED | OUTPATIENT
Start: 2023-11-05

## 2023-11-05 RX ORDER — FENTANYL CITRATE 50 UG/ML
INJECTION, SOLUTION INTRAMUSCULAR; INTRAVENOUS
Status: DISCONTINUED | OUTPATIENT
Start: 2023-11-05 | End: 2023-11-05

## 2023-11-05 RX ORDER — LIDOCAINE HYDROCHLORIDE 20 MG/ML
INJECTION, SOLUTION EPIDURAL; INFILTRATION; INTRACAUDAL; PERINEURAL
Status: DISCONTINUED | OUTPATIENT
Start: 2023-11-05 | End: 2023-11-05

## 2023-11-05 RX ORDER — LIDOCAINE HYDROCHLORIDE 10 MG/ML
1 INJECTION, SOLUTION EPIDURAL; INFILTRATION; INTRACAUDAL; PERINEURAL ONCE
Status: CANCELLED | OUTPATIENT
Start: 2023-11-05 | End: 2023-11-05

## 2023-11-05 RX ORDER — MIDAZOLAM HYDROCHLORIDE 1 MG/ML
INJECTION INTRAMUSCULAR; INTRAVENOUS
Status: DISCONTINUED | OUTPATIENT
Start: 2023-11-05 | End: 2023-11-05

## 2023-11-05 RX ORDER — ONDANSETRON 2 MG/ML
INJECTION INTRAMUSCULAR; INTRAVENOUS
Status: DISCONTINUED | OUTPATIENT
Start: 2023-11-05 | End: 2023-11-05

## 2023-11-05 RX ORDER — MIDAZOLAM HYDROCHLORIDE 1 MG/ML
2 INJECTION INTRAMUSCULAR; INTRAVENOUS ONCE AS NEEDED
Status: CANCELLED | OUTPATIENT
Start: 2023-11-05 | End: 2035-04-02

## 2023-11-05 RX ORDER — LORAZEPAM 2 MG/ML
0.25 INJECTION INTRAMUSCULAR ONCE AS NEEDED
Status: DISCONTINUED | OUTPATIENT
Start: 2023-11-05 | End: 2023-11-05 | Stop reason: HOSPADM

## 2023-11-05 RX ORDER — KETOROLAC TROMETHAMINE 30 MG/ML
INJECTION, SOLUTION INTRAMUSCULAR; INTRAVENOUS
Status: DISCONTINUED | OUTPATIENT
Start: 2023-11-05 | End: 2023-11-05

## 2023-11-05 RX ORDER — HYDROMORPHONE HYDROCHLORIDE 2 MG/ML
INJECTION, SOLUTION INTRAMUSCULAR; INTRAVENOUS; SUBCUTANEOUS
Status: DISCONTINUED | OUTPATIENT
Start: 2023-11-05 | End: 2023-11-05

## 2023-11-05 RX ADMIN — MIDAZOLAM HYDROCHLORIDE 2 MG: 1 INJECTION, SOLUTION INTRAMUSCULAR; INTRAVENOUS at 07:11

## 2023-11-05 RX ADMIN — ONDANSETRON 4 MG: 2 INJECTION INTRAMUSCULAR; INTRAVENOUS at 08:11

## 2023-11-05 RX ADMIN — PHENYLEPHRINE HYDROCHLORIDE 100 MCG: 10 INJECTION INTRAVENOUS at 07:11

## 2023-11-05 RX ADMIN — GABAPENTIN 300 MG: 300 CAPSULE ORAL at 07:11

## 2023-11-05 RX ADMIN — OXYCODONE HYDROCHLORIDE 5 MG: 5 TABLET ORAL at 08:11

## 2023-11-05 RX ADMIN — Medication 6 MG: at 07:11

## 2023-11-05 RX ADMIN — ACETAMINOPHEN 650 MG: 325 TABLET, FILM COATED ORAL at 10:11

## 2023-11-05 RX ADMIN — GABAPENTIN 300 MG: 300 CAPSULE ORAL at 02:11

## 2023-11-05 RX ADMIN — PROPOFOL 200 MG: 10 INJECTION, EMULSION INTRAVENOUS at 07:11

## 2023-11-05 RX ADMIN — SODIUM CHLORIDE, SODIUM GLUCONATE, SODIUM ACETATE, POTASSIUM CHLORIDE AND MAGNESIUM CHLORIDE: 526; 502; 368; 37; 30 INJECTION, SOLUTION INTRAVENOUS at 07:11

## 2023-11-05 RX ADMIN — OXYCODONE HYDROCHLORIDE 10 MG: 10 TABLET ORAL at 10:11

## 2023-11-05 RX ADMIN — SODIUM CHLORIDE, POTASSIUM CHLORIDE, SODIUM LACTATE AND CALCIUM CHLORIDE: 600; 310; 30; 20 INJECTION, SOLUTION INTRAVENOUS at 11:11

## 2023-11-05 RX ADMIN — HYDROMORPHONE HYDROCHLORIDE 1.5 MG: 2 INJECTION, SOLUTION INTRAMUSCULAR; INTRAVENOUS; SUBCUTANEOUS at 08:11

## 2023-11-05 RX ADMIN — PHENYLEPHRINE HYDROCHLORIDE 50 MCG: 10 INJECTION INTRAVENOUS at 07:11

## 2023-11-05 RX ADMIN — METHOCARBAMOL 500 MG: 500 TABLET ORAL at 04:11

## 2023-11-05 RX ADMIN — DEXAMETHASONE SODIUM PHOSPHATE 4 MG: 4 INJECTION, SOLUTION INTRA-ARTICULAR; INTRALESIONAL; INTRAMUSCULAR; INTRAVENOUS; SOFT TISSUE at 07:11

## 2023-11-05 RX ADMIN — ACETAMINOPHEN 650 MG: 325 TABLET, FILM COATED ORAL at 07:11

## 2023-11-05 RX ADMIN — LIDOCAINE HYDROCHLORIDE 80 MG: 20 INJECTION, SOLUTION EPIDURAL; INFILTRATION; INTRACAUDAL; PERINEURAL at 07:11

## 2023-11-05 RX ADMIN — DOCUSATE SODIUM 100 MG: 100 CAPSULE, LIQUID FILLED ORAL at 07:11

## 2023-11-05 RX ADMIN — CEFTRIAXONE SODIUM 2 G: 2 INJECTION, POWDER, FOR SOLUTION INTRAMUSCULAR; INTRAVENOUS at 03:11

## 2023-11-05 RX ADMIN — HYDROMORPHONE HYDROCHLORIDE 1 MG: 2 INJECTION INTRAMUSCULAR; INTRAVENOUS; SUBCUTANEOUS at 12:11

## 2023-11-05 RX ADMIN — KETOROLAC TROMETHAMINE 15 MG: 30 INJECTION, SOLUTION INTRAMUSCULAR; INTRAVENOUS at 08:11

## 2023-11-05 RX ADMIN — FENTANYL CITRATE 100 MCG: 50 INJECTION, SOLUTION INTRAMUSCULAR; INTRAVENOUS at 07:11

## 2023-11-05 RX ADMIN — SODIUM CHLORIDE, POTASSIUM CHLORIDE, SODIUM LACTATE AND CALCIUM CHLORIDE: 600; 310; 30; 20 INJECTION, SOLUTION INTRAVENOUS at 08:11

## 2023-11-05 RX ADMIN — POLYETHYLENE GLYCOL 3350 17 G: 17 POWDER, FOR SOLUTION ORAL at 10:11

## 2023-11-05 RX ADMIN — MUPIROCIN: 20 OINTMENT TOPICAL at 10:11

## 2023-11-05 RX ADMIN — ENOXAPARIN SODIUM 40 MG: 40 INJECTION SUBCUTANEOUS at 04:11

## 2023-11-05 RX ADMIN — GABAPENTIN 300 MG: 300 CAPSULE ORAL at 10:11

## 2023-11-05 RX ADMIN — HYDROMORPHONE HYDROCHLORIDE 0.5 MG: 2 INJECTION, SOLUTION INTRAMUSCULAR; INTRAVENOUS; SUBCUTANEOUS at 07:11

## 2023-11-05 RX ADMIN — ACETAMINOPHEN 325 MG: 325 TABLET, FILM COATED ORAL at 03:11

## 2023-11-05 RX ADMIN — ACETAMINOPHEN 650 MG: 325 TABLET, FILM COATED ORAL at 04:11

## 2023-11-05 RX ADMIN — DOCUSATE SODIUM 100 MG: 100 CAPSULE, LIQUID FILLED ORAL at 10:11

## 2023-11-05 RX ADMIN — ACETAMINOPHEN 650 MG: 325 TABLET, FILM COATED ORAL at 02:11

## 2023-11-05 RX ADMIN — METHOCARBAMOL 500 MG: 500 TABLET ORAL at 02:11

## 2023-11-05 RX ADMIN — OXYCODONE HYDROCHLORIDE 10 MG: 10 TABLET ORAL at 03:11

## 2023-11-05 RX ADMIN — METHOCARBAMOL 500 MG: 500 TABLET ORAL at 07:11

## 2023-11-05 NOTE — ANESTHESIA POSTPROCEDURE EVALUATION
Anesthesia Post Evaluation    Patient: Fabio Mcclendon    Procedure(s) Performed: Procedure(s) (LRB):  INCISION AND DRAINAGE, UPPER EXTREMITY (Left)    Final Anesthesia Type: general      Patient location during evaluation: floor  Patient participation: Yes- Able to Participate  Level of consciousness: awake and alert  Post-procedure vital signs: reviewed and stable  Pain management: adequate  Airway patency: patent    PONV status at discharge: No PONV  Anesthetic complications: no      Cardiovascular status: blood pressure returned to baseline  Respiratory status: spontaneous ventilation and room air  Hydration status: euvolemic  Follow-up not needed.          Vitals Value Taken Time   /87 11/05/23 0924   Temp  11/05/23 1314   Pulse 82 11/05/23 0923   Resp 16 11/05/23 1006   SpO2 99 % 11/05/23 0923   Vitals shown include unvalidated device data.      Event Time   Out of Recovery 11/05/2023 09:30:00         Pain/Hyacinth Score: Pain Rating Prior to Med Admin: 9 (11/5/2023 10:07 AM)  Pain Rating Post Med Admin: 5 (11/5/2023 11:06 AM)  Hyacinth Score: 10 (11/5/2023  9:20 AM)

## 2023-11-05 NOTE — TRANSFER OF CARE
"Anesthesia Transfer of Care Note    Patient: Fabio Mcclendon    Procedure(s) Performed: Procedure(s) (LRB):  INCISION AND DRAINAGE, UPPER EXTREMITY (Left)    Patient location: PACU    Anesthesia Type: general    Transport from OR: Transported from OR on 2-3 L/min O2 by NC with adequate spontaneous ventilation    Post pain: adequate analgesia    Post assessment: no apparent anesthetic complications    Post vital signs: stable    Level of consciousness: responds to stimulation    Nausea/Vomiting: no nausea/vomiting    Complications: none    Transfer of care protocol was followedComments: Detailed report with handoff to licensed provider complete      Last vitals:   Visit Vitals  /65   Pulse 73   Temp 37 °C (98.6 °F) (Oral)   Resp (!) 9   Ht 5' 9" (1.753 m)   Wt 90.6 kg (199 lb 11.8 oz)   SpO2 97%   BMI 29.50 kg/m²     " 2010

## 2023-11-05 NOTE — ANESTHESIA PROCEDURE NOTES
Intubation    Date/Time: 11/5/2023 7:12 AM    Performed by: John Calzada CRNA  Authorized by: Neal Gonzalez Jr., MD    Intubation:     Induction:  Intravenous    Intubated:  Postinduction    Mask Ventilation:  Easy mask    Attempts:  1    Attempted By:  CRNA    Method of Intubation:  Direct    Difficult Airway Encountered?: No      Complications:  None    Airway Device:  Supraglottic airway/LMA    Airway Device Size:  4.0    Style/Cuff Inflation:  Cuffed    Placement Verified By:  Capnometry    Complicating Factors:  None    Findings Post-Intubation:  BS equal bilateral

## 2023-11-05 NOTE — OP NOTE
OPERATIVE REPORT    Patient: Fabio Mcclendon   : 1970    MRN: [id number]  Date: 2023      Surgeon:Lucian Vieyra DO  Assistant: Sonny Dutton was essential, part of the procedure including deep hardware placement and deep closure.  No senior assistant was availible  Preoperative Diagnosis: Left open radial head/neck fracture severe soft tissue trauma  Postoperative Diagnosis: Same  Procedure:    1) Left radial head arthroplasty - 02382  2) adjustment of multiplane external fixator left left elbow  3) left elbow excisional debridement skin subcutaneous tissue muscle fascia 14 cm x 2 cm  Anesthesiologist: Neal Gonzalez Jr., MD  OR Staff: Circulator: Sandra Merino RN  Physician Assistant: Tawana Dutton PA-C  Radiology Technologist: Alexandra Bangura, RT  Scrub Person: Laury Macias  Implants:   Implant Name Type Inv. Item Serial No.  Lot No. LRB No. Used Action   align radial stem     [id number] Left 1 Implanted and Explanted   align radial head and lock screw    [employer] [id number] Left 1 Implanted   align radial stem    [employer] [id number] Left 1 Implanted     EBL: 50cc  Complications: None  Disposition: To PACU, stable      Indications: Fabio Mcclendon is a 53 y.o. male presenting with the aforementioned injuries/findings. The procedure is indicated to best obtain and maintain stability about the elbow to optimize outcomes.  The patient is awake and alert. After thorough discussion of the risks, benefits, expected outcomes, and alternatives to surgical intervention, the patient agreed to proceed with surgical treatment.  Specific risks discussed included, but were not limited to: superficial or deep infection, wound healing complications, DVT/PE, significant bleeding requiring transfusion, damage to named anatomic structures in the immediate area including named neurovascular structures, failure to alleviate pain control, implant failure  or periprosthetic fracture, elbow instability, and general risks of anesthesia.  The patient voiced understanding and written as well as verbal consent was obtained by myself prior to the procedure.    Patient has severe soft tissue injury to the anterior aspect of the elbow Dr. Blakc has asked me to wash elbow out and possibly replace the radial head today.  IJS already in place holding the elbow stabilize.  Patient is at increased risk of stiffness soft tissue necrosis possible return to OR for this unlikely during this hospital stay.  Patient will follow up with Dr. Black.    Procedure Note:  The patient was brought back to the OR and placed supine on the OR table. After successful induction of anesthesia by anesthesia staff, the patient was positioned in the supine position and all bony prominences were padded appropriately.  The surgical field was then provisionally cleansed and then prepped and draped in the usual sterile fashion.    At this time a time-out was performed, with the correct patient, site, and procedure identified.  The universal time out as well as sign your site protocols were followed.  Preoperative antibiotics were verified as administered.     Remade previous incision of the posterior aspect of the arm.  Patient has hematoma and superficial blistering to the skin.      I completed excisional debridement with a 15 blade rongeur curette of necrotic skin subcutaneous tissue muscle fascia.  This was extra work needed to prepare of the wound bed for radial head placement outside normal scope of surgical dissection.        Copiously irrigated the wound.  This is a proximally 14 x 2 cm  Attention to hemostasis was paid using electrocautery.  The multiplane ex fix device was loosened elbow partially dislocated.  Previous radial neck cut appreciated from previous surgery.  We delivered the proximal radius into the surgical field and made sure not to put any retractors on the radial neck.  We used  the skeletal Dynamic radial head system according to  instructions.  We utilized the straight broaches and implants.  The trial was used and a trial head applied to the stem.  We then placed final implants.  We placed the aiming guide and tightened the radial head in anatomic fashion.  I then tightened the multiplane external fixator IJS in appropriate position under intraoperative fluoroscopy.  We took this through a range of motion and evaluated the elbow and wrist joint to assess size and joint reduction.  The elbow was reduced and taken through a range of motion; the elbow was found to be stable.  Layered closure was then completed around the IJS.  Final C-arm images were obtained and saved to the [employer] system of the wrist, and elbow.    The incisions were then irrigated using copious sterile saline and then closed in layered fashion.  The surgical sites were sterilely cleansed and dressed.    The patient was then subsequently extubated and transferred to to PACU in a stable condition.    All sponge and needle counts were correct at the end of the case.  I was present and participated in all aspects of the procedure.    Prognosis:  The patient will be kept NWB on the ipsilateral extremity ROMAT for 8 weeks. Follow up with Dr Black in 2 weeks.  Patient may need further wound care due to his blistering.  We will place him with an elevation pillow specifically designed for the upper extremity to help elevation.  Compartments are soft and compressible.  DVT prophylaxis is not indicated for this patient and procedure.  The patient is at risk of pain and stiffness, so we will allow immediate ROM of the elbow.      I called the wife twice without success after surgery.        This note/OR report was created with the assistance of  voice recognition software or phone  dictation.  There may be transcription errors as a result of using this technology however minimal. Effort has been made to assure accuracy  of transcription but any obvious errors or omissions should be clarified with the author of the document.       Lucian Vieyra, DO  Orthopedic Trauma Surgery

## 2023-11-05 NOTE — PROGRESS NOTES
Patient is taken to the OR today for washout possible radial head replacement.  I have been asked to do this by my partner Dr. Black.  Patient has severe open injury to left elbow with contamination.  He completed excisional debridement and IJS placement due to the elbow instability followed by possible radial head replacement today.  He understands the risks and benefits with the future he understands that severe elbow injury likely leads to stiffness he also has a high risk for infection.  He has been on antibiotics.  We will explore the left elbow completed excisional debridement and evaluate him for radial head replacement.    This note/OR report was created with the assistance of  voice recognition software or phone  dictation.  There may be transcription errors as a result of using this technology however minimal. Effort has been made to assure accuracy of transcription but any obvious errors or omissions should be clarified with the author of the document.       Lucian Vieyra, DO  Orthopedic Trauma Surgery

## 2023-11-06 LAB
ALBUMIN SERPL-MCNC: 2.9 G/DL (ref 3.5–5)
ALBUMIN/GLOB SERPL: 1 RATIO (ref 1.1–2)
ALP SERPL-CCNC: 55 UNIT/L (ref 40–150)
ALT SERPL-CCNC: 31 UNIT/L (ref 0–55)
AST SERPL-CCNC: 40 UNIT/L (ref 5–34)
BASOPHILS # BLD AUTO: 0.04 X10(3)/MCL
BASOPHILS NFR BLD AUTO: 0.3 %
BILIRUB SERPL-MCNC: 0.6 MG/DL
BUN SERPL-MCNC: 14.7 MG/DL (ref 8.4–25.7)
CALCIUM SERPL-MCNC: 8.4 MG/DL (ref 8.4–10.2)
CHLORIDE SERPL-SCNC: 102 MMOL/L (ref 98–107)
CO2 SERPL-SCNC: 29 MMOL/L (ref 22–29)
CREAT SERPL-MCNC: 0.76 MG/DL (ref 0.73–1.18)
CRP SERPL-MCNC: 203.6 MG/L
EOSINOPHIL # BLD AUTO: 0.18 X10(3)/MCL (ref 0–0.9)
EOSINOPHIL NFR BLD AUTO: 1.5 %
ERYTHROCYTE [DISTWIDTH] IN BLOOD BY AUTOMATED COUNT: 12.2 % (ref 11.5–17)
GFR SERPLBLD CREATININE-BSD FMLA CKD-EPI: >60 MLS/MIN/1.73/M2
GLOBULIN SER-MCNC: 3 GM/DL (ref 2.4–3.5)
GLUCOSE SERPL-MCNC: 116 MG/DL (ref 74–100)
HCT VFR BLD AUTO: 29.4 % (ref 42–52)
HGB BLD-MCNC: 9.9 G/DL (ref 14–18)
IMM GRANULOCYTES # BLD AUTO: 0.05 X10(3)/MCL (ref 0–0.04)
IMM GRANULOCYTES NFR BLD AUTO: 0.4 %
LYMPHOCYTES # BLD AUTO: 1.75 X10(3)/MCL (ref 0.6–4.6)
LYMPHOCYTES NFR BLD AUTO: 14.3 %
MCH RBC QN AUTO: 30.7 PG (ref 27–31)
MCHC RBC AUTO-ENTMCNC: 33.7 G/DL (ref 33–36)
MCV RBC AUTO: 91.3 FL (ref 80–94)
MONOCYTES # BLD AUTO: 1.5 X10(3)/MCL (ref 0.1–1.3)
MONOCYTES NFR BLD AUTO: 12.3 %
NEUTROPHILS # BLD AUTO: 8.7 X10(3)/MCL (ref 2.1–9.2)
NEUTROPHILS NFR BLD AUTO: 71.2 %
NRBC BLD AUTO-RTO: 0 %
PLATELET # BLD AUTO: 238 X10(3)/MCL (ref 130–400)
PMV BLD AUTO: 10.9 FL (ref 7.4–10.4)
POTASSIUM SERPL-SCNC: 4.1 MMOL/L (ref 3.5–5.1)
PREALB SERPL-MCNC: 15.4 MG/DL (ref 18–45)
PROT SERPL-MCNC: 5.9 GM/DL (ref 6.4–8.3)
RBC # BLD AUTO: 3.22 X10(6)/MCL (ref 4.7–6.1)
SODIUM SERPL-SCNC: 140 MMOL/L (ref 136–145)
WBC # SPEC AUTO: 12.22 X10(3)/MCL (ref 4.5–11.5)

## 2023-11-06 PROCEDURE — 25000003 PHARM REV CODE 250: Performed by: SURGERY

## 2023-11-06 PROCEDURE — 25000003 PHARM REV CODE 250: Performed by: ORTHOPAEDIC SURGERY

## 2023-11-06 PROCEDURE — 63600175 PHARM REV CODE 636 W HCPCS: Performed by: NURSE PRACTITIONER

## 2023-11-06 PROCEDURE — 85025 COMPLETE CBC W/AUTO DIFF WBC: CPT | Performed by: NURSE PRACTITIONER

## 2023-11-06 PROCEDURE — 80053 COMPREHEN METABOLIC PANEL: CPT | Performed by: NURSE PRACTITIONER

## 2023-11-06 PROCEDURE — 86140 C-REACTIVE PROTEIN: CPT | Performed by: NURSE PRACTITIONER

## 2023-11-06 PROCEDURE — 25000003 PHARM REV CODE 250: Performed by: NURSE PRACTITIONER

## 2023-11-06 PROCEDURE — 84134 ASSAY OF PREALBUMIN: CPT | Performed by: NURSE PRACTITIONER

## 2023-11-06 PROCEDURE — 21400001 HC TELEMETRY ROOM

## 2023-11-06 PROCEDURE — 63600175 PHARM REV CODE 636 W HCPCS: Performed by: PHYSICIAN ASSISTANT

## 2023-11-06 PROCEDURE — 25000003 PHARM REV CODE 250: Performed by: PHYSICIAN ASSISTANT

## 2023-11-06 RX ORDER — SODIUM CHLORIDE 9 MG/ML
INJECTION, SOLUTION INTRAVENOUS
Status: DISCONTINUED | OUTPATIENT
Start: 2023-11-06 | End: 2023-11-07 | Stop reason: HOSPADM

## 2023-11-06 RX ADMIN — METHOCARBAMOL 500 MG: 500 TABLET ORAL at 09:11

## 2023-11-06 RX ADMIN — DOCUSATE SODIUM 100 MG: 100 CAPSULE, LIQUID FILLED ORAL at 10:11

## 2023-11-06 RX ADMIN — POLYETHYLENE GLYCOL 3350 17 G: 17 POWDER, FOR SOLUTION ORAL at 10:11

## 2023-11-06 RX ADMIN — METHOCARBAMOL 500 MG: 500 TABLET ORAL at 04:11

## 2023-11-06 RX ADMIN — CEFTRIAXONE SODIUM 2 G: 2 INJECTION, POWDER, FOR SOLUTION INTRAMUSCULAR; INTRAVENOUS at 02:11

## 2023-11-06 RX ADMIN — GABAPENTIN 300 MG: 300 CAPSULE ORAL at 10:11

## 2023-11-06 RX ADMIN — OXYCODONE HYDROCHLORIDE 5 MG: 5 TABLET ORAL at 03:11

## 2023-11-06 RX ADMIN — ACETAMINOPHEN 650 MG: 325 TABLET, FILM COATED ORAL at 02:11

## 2023-11-06 RX ADMIN — Medication 6 MG: at 09:11

## 2023-11-06 RX ADMIN — ACETAMINOPHEN 650 MG: 325 TABLET, FILM COATED ORAL at 10:11

## 2023-11-06 RX ADMIN — SODIUM CHLORIDE: 9 INJECTION, SOLUTION INTRAVENOUS at 02:11

## 2023-11-06 RX ADMIN — MUPIROCIN: 20 OINTMENT TOPICAL at 09:11

## 2023-11-06 RX ADMIN — SODIUM CHLORIDE, POTASSIUM CHLORIDE, SODIUM LACTATE AND CALCIUM CHLORIDE: 600; 310; 30; 20 INJECTION, SOLUTION INTRAVENOUS at 05:11

## 2023-11-06 RX ADMIN — ENOXAPARIN SODIUM 40 MG: 40 INJECTION SUBCUTANEOUS at 05:11

## 2023-11-06 RX ADMIN — ACETAMINOPHEN 650 MG: 325 TABLET, FILM COATED ORAL at 09:11

## 2023-11-06 RX ADMIN — GABAPENTIN 300 MG: 300 CAPSULE ORAL at 09:11

## 2023-11-06 RX ADMIN — ACETAMINOPHEN 650 MG: 325 TABLET, FILM COATED ORAL at 05:11

## 2023-11-06 RX ADMIN — MUPIROCIN: 20 OINTMENT TOPICAL at 10:11

## 2023-11-06 RX ADMIN — OXYCODONE HYDROCHLORIDE 5 MG: 5 TABLET ORAL at 10:11

## 2023-11-06 RX ADMIN — GABAPENTIN 300 MG: 300 CAPSULE ORAL at 02:11

## 2023-11-06 RX ADMIN — SODIUM CHLORIDE, POTASSIUM CHLORIDE, SODIUM LACTATE AND CALCIUM CHLORIDE: 600; 310; 30; 20 INJECTION, SOLUTION INTRAVENOUS at 02:11

## 2023-11-06 RX ADMIN — DOCUSATE SODIUM 100 MG: 100 CAPSULE, LIQUID FILLED ORAL at 09:11

## 2023-11-06 RX ADMIN — METHOCARBAMOL 500 MG: 500 TABLET ORAL at 02:11

## 2023-11-06 NOTE — PLAN OF CARE
11/06/23 1119   Discharge Assessment   Assessment Type Discharge Planning Assessment   Confirmed/corrected address, phone number and insurance Yes   Confirmed Demographics Correct on Facesheet   Source of Information patient;family   Reason For Admission Dislocation of Left Elbow   People in Home spouse   Do you expect to return to your current living situation? Yes   Do you have help at home or someone to help you manage your care at home? Yes   Prior to hospitilization cognitive status: Alert/Oriented   Current cognitive status: Alert/Oriented   Home Accessibility wheelchair accessible   Home Layout Able to live on 1st floor   Equipment Currently Used at Home none   Readmission within 30 days? No   Patient currently being followed by outpatient case management? No   Do you currently have service(s) that help you manage your care at home? No   Do you take prescription medications? Yes   Do you have prescription coverage? Yes   Do you have any problems affording any of your prescribed medications? No   Is the patient taking medications as prescribed? yes   How do you get to doctors appointments? family or friend will provide   Are you on dialysis? No   Do you take coumadin? No   DME Needed Upon Discharge  other (see comments)  (TBD)   Discharge Plan discussed with: Patient;Spouse/sig other   Transition of Care Barriers None   Discharge Plan A Home with family   Discharge Plan B Home Health     Patient lives with spouse in [place]. Spouse is a retired OR nurse and will be caring for patient upon DC. Patient was independent prior to admission. Patient currently does not have a PCP but will establish on in [place] once discharged.

## 2023-11-06 NOTE — PLAN OF CARE
Problem: Adult Inpatient Plan of Care  Goal: Plan of Care Review  Outcome: Ongoing, Progressing  Goal: Patient-Specific Goal (Individualized)  Outcome: Ongoing, Progressing  Goal: Absence of Hospital-Acquired Illness or Injury  Outcome: Ongoing, Progressing  Goal: Optimal Comfort and Wellbeing  Outcome: Ongoing, Progressing  Goal: Readiness for Transition of Care  Outcome: Ongoing, Progressing     Problem: Adult Inpatient Plan of Care  Goal: Plan of Care Review  11/5/2023 1953 by Maria Del Rosario Felix RN  Outcome: Ongoing, Progressing  11/5/2023 1953 by Maria Del Rosario Felix RN  Outcome: Ongoing, Progressing  Goal: Patient-Specific Goal (Individualized)  11/5/2023 1953 by Maria Del Rosario Felix RN  Outcome: Ongoing, Progressing  11/5/2023 1953 by Maria Del Rosario Felix RN  Outcome: Ongoing, Progressing  Goal: Absence of Hospital-Acquired Illness or Injury  11/5/2023 1953 by Maria Del Rosario Felix RN  Outcome: Ongoing, Progressing  11/5/2023 1953 by Maria Del Rosario Felix RN  Outcome: Ongoing, Progressing  Goal: Optimal Comfort and Wellbeing  11/5/2023 1953 by Maria Del Rosario Felix RN  Outcome: Ongoing, Progressing  11/5/2023 1953 by Maria Del Rosario Felix RN  Outcome: Ongoing, Progressing  Goal: Readiness for Transition of Care  11/5/2023 1953 by Maria Del Rosario Felix RN  Outcome: Ongoing, Progressing  11/5/2023 1953 by Maria Del Rosario Felix RN  Outcome: Ongoing, Progressing     Problem: Pain Acute  Goal: Acceptable Pain Control and Functional Ability  Outcome: Ongoing, Progressing     Problem: Infection  Goal: Absence of Infection Signs and Symptoms  Outcome: Ongoing, Progressing

## 2023-11-06 NOTE — PLAN OF CARE
Problem: Adult Inpatient Plan of Care  Goal: Plan of Care Review  11/5/2023 2215 by Greg Logan RN  Outcome: Ongoing, Progressing  11/5/2023 2215 by Greg Logan RN  Outcome: Ongoing, Progressing  Goal: Patient-Specific Goal (Individualized)  11/5/2023 2215 by Greg Logan RN  Outcome: Ongoing, Progressing  11/5/2023 2215 by Greg Logan RN  Outcome: Ongoing, Progressing  Goal: Absence of Hospital-Acquired Illness or Injury  11/5/2023 2215 by Greg Logan RN  Outcome: Ongoing, Progressing  11/5/2023 2215 by Greg Logan RN  Outcome: Ongoing, Progressing  Goal: Optimal Comfort and Wellbeing  11/5/2023 2215 by Greg Logan RN  Outcome: Ongoing, Progressing  11/5/2023 2215 by Greg Logan RN  Outcome: Ongoing, Progressing  Goal: Readiness for Transition of Care  11/5/2023 2215 by Greg Logan RN  Outcome: Ongoing, Progressing  11/5/2023 2215 by Greg Logan RN  Outcome: Ongoing, Progressing     Problem: Pain Acute  Goal: Acceptable Pain Control and Functional Ability  11/5/2023 2215 by Greg Logan RN  Outcome: Ongoing, Progressing  11/5/2023 2215 by Greg Logan RN  Outcome: Ongoing, Progressing     Problem: Infection  Goal: Absence of Infection Signs and Symptoms  11/5/2023 2215 by Greg Logan RN  Outcome: Ongoing, Progressing  11/5/2023 2215 by Greg Logan RN  Outcome: Ongoing, Progressing

## 2023-11-06 NOTE — PROGRESS NOTES
[facility] - 9th Floor Med Surg  Orthopedics  Progress Note    Patient Name: Fabio Mcclendon  MRN: [id number]  Admission Date: 11/2/2023  Hospital Length of Stay: 4 days  Attending Provider: Marco Black MD  Primary Care Provider: Kasia, Primary Doctor  Follow-up For: Procedure(s) (LRB):  INCISION AND DRAINAGE, UPPER EXTREMITY (Left)  ORIF, ELBOW (Left)    Post-Operative Day: 1 Day Post-Op  Subjective:     Principal Problem:Type III open displaced fracture of neck of left radius    Principal Orthopedic Problem: 1 Day Post-Op   Right elbow I&D with IJS placement and radial head arthroplasty (Dr Black)    Interval History: Resting comfortably this morning. Pain is well controlled.  Swelling of the axilla as expected.  Elevated appropriately.  Pain is well controlled.    Review of patient's allergies indicates:   Allergen Reactions    Levaquin [levofloxacin] Rash    Opioids - morphine analogues Rash       Current Facility-Administered Medications   Medication    acetaminophen tablet 650 mg    cefTRIAXone (ROCEPHIN) 2 g in dextrose 5 % in water (D5W) 100 mL IVPB (MB+)    docusate sodium capsule 100 mg    enoxaparin injection 40 mg    gabapentin capsule 300 mg    HYDROmorphone (PF) injection 1 mg    lactated ringers infusion    magnesium hydroxide 400 mg/5 ml suspension 2,400 mg    melatonin tablet 6 mg    methocarbamoL tablet 500 mg    mupirocin 2 % ointment    ondansetron injection 4 mg    oxyCODONE immediate release tablet 5 mg    oxyCODONE immediate release tablet Tab 10 mg    polyethylene glycol packet 17 g     Objective:     Vital Signs (Most Recent):  Temp: 98.2 °F (36.8 °C) (11/06/23 0732)  Pulse: 69 (11/06/23 0732)  Resp: 18 (11/06/23 1005)  BP: (!) 147/78 (11/06/23 0732)  SpO2: 97 % (11/06/23 0732) Vital Signs (24h Range):  Temp:  [98.1 °F (36.7 °C)-98.6 °F (37 °C)] 98.2 °F (36.8 °C)  Pulse:  [56-81] 69  Resp:  [16-18] 18  SpO2:  [81 %-99 %] 97 %  BP: (116-152)/(63-78) 147/78     Weight: 90.6  "kg (199 lb 11.8 oz)  Height: 5' 9" (175.3 cm)  Body mass index is 29.5 kg/m².      Intake/Output Summary (Last 24 hours) at 11/6/2023 1059  Last data filed at 11/6/2023 0723  Gross per 24 hour   Intake 320 ml   Output 1025 ml   Net -705 ml       Physical Exam:   Musculoskeletal:     Left upper extremity ecchymosis present to the right upper arm in the axilla will likely continue to drift to the rib cage, compartments are soft and compressible; elbow ROM, able to flex and extend all digits and wrist, moderate tenderness to palpation; AIN/PIN/Ulnar motor intact; SILT distally with mild numbness in tingling in thumb 1st and long finger;BCR distally; Radial pulse palpable        Diagnostic Findings:   Significant Labs:   Recent Lab Results         11/06/23  0439   11/05/23  0517   11/04/23  0552        Albumin/Globulin Ratio 1.0   1.1   1.3       Albumin 2.9   2.7   2.9       ALP 55   41   36       ALT 31   19   18       AST 40   35   28       Baso # 0.04   0.04   0.01       Basophil % 0.3   0.4   0.1       BILIRUBIN TOTAL 0.6   0.5   0.6       BUN 14.7   10.3   9.3       Calcium 8.4   7.7   8.0       Chloride 102   97   105       CO2 29   25   26       Creatinine 0.76   0.78   0.76       .60           eGFR >60   >60   >60       Eos # 0.18   0.13   0.01       Eosinophil % 1.5   1.2   0.1       Globulin, Total 3.0   2.4   2.3       Glucose 116   121   121       Hematocrit 29.4   28.7   30.2       Hemoglobin 9.9   9.4   10.5       Immature Grans (Abs) 0.05   0.04   0.05       Immature Granulocytes 0.4   0.4   0.4       Lymph # 1.75   1.20   1.11       LYMPH % 14.3   11.3   8.5       MCH 30.7   30.2   30.8       MCHC 33.7   32.8   34.8       MCV 91.3   92.3   88.6       Mono # 1.50   1.38   1.87       Mono % 12.3   13.0   14.4       MPV 10.9   10.8   11.0       Neut # 8.70   7.82   9.94       Neut % 71.2   73.7   76.5       nRBC 0.0   0.0   0.0       Platelet Count 238   161   143       Potassium 4.1   3.7   4.1    "    Prealbumin 15.4           PROTEIN TOTAL 5.9   5.1   5.2       RBC 3.22   3.11   3.41       RDW 12.2   12.4   12.5       Sodium 140   127   138       WBC 12.22   10.61   12.99                Significant Imaging: I have reviewed and interpreted all pertinent imaging results/findings.     Assessment/Plan:     Active Diagnoses:    Diagnosis Date Noted POA    PRINCIPAL PROBLEM:  Type III open displaced fracture of neck of left radius [S52.132C] 11/03/2023 Yes    Dislocation of left elbow [S53.105A] 11/03/2023 Yes      Problems Resolved During this Admission:   H&H with slight drop. Stable  Will need repeat I&D tomorrow with possible radial head arthoplasty at that time if not grossly contaminated.   Patient has been on IV antibiotics.  Underwent come bleeding his left elbow surgery yesterday.  He is elevated today.  Was called last night over skin changes of the left upper arm he does have swelling and ecchymosis which appears to be normal for his type of injury.  Continue aggressive elevation hopeful for discharge tomorrow.  REJI JONES    This note/OR report was created with the assistance of  voice recognition software or phone  dictation.  There may be transcription errors as a result of using this technology however minimal. Effort has been made to assure accuracy of transcription but any obvious errors or omissions should be clarified with the author of the document.       Lucian Vieyra, DO  Orthopedic Trauma Surgery

## 2023-11-07 ENCOUNTER — TELEPHONE (OUTPATIENT)
Dept: ORTHOPEDICS | Facility: CLINIC | Age: 53
End: 2023-11-07
Payer: COMMERCIAL

## 2023-11-07 VITALS
HEART RATE: 79 BPM | WEIGHT: 199.75 LBS | HEIGHT: 69 IN | RESPIRATION RATE: 18 BRPM | TEMPERATURE: 99 F | SYSTOLIC BLOOD PRESSURE: 128 MMHG | OXYGEN SATURATION: 95 % | DIASTOLIC BLOOD PRESSURE: 72 MMHG | BODY MASS INDEX: 29.59 KG/M2

## 2023-11-07 LAB
ALBUMIN SERPL-MCNC: 2.9 G/DL (ref 3.5–5)
ALBUMIN/GLOB SERPL: 1 RATIO (ref 1.1–2)
ALP SERPL-CCNC: 59 UNIT/L (ref 40–150)
ALT SERPL-CCNC: 76 UNIT/L (ref 0–55)
AST SERPL-CCNC: 61 UNIT/L (ref 5–34)
BASOPHILS # BLD AUTO: 0.06 X10(3)/MCL
BASOPHILS NFR BLD AUTO: 0.6 %
BILIRUB SERPL-MCNC: 0.6 MG/DL
BUN SERPL-MCNC: 15.4 MG/DL (ref 8.4–25.7)
CALCIUM SERPL-MCNC: 8.8 MG/DL (ref 8.4–10.2)
CHLORIDE SERPL-SCNC: 104 MMOL/L (ref 98–107)
CO2 SERPL-SCNC: 25 MMOL/L (ref 22–29)
CREAT SERPL-MCNC: 0.79 MG/DL (ref 0.73–1.18)
EOSINOPHIL # BLD AUTO: 0.4 X10(3)/MCL (ref 0–0.9)
EOSINOPHIL NFR BLD AUTO: 4.3 %
ERYTHROCYTE [DISTWIDTH] IN BLOOD BY AUTOMATED COUNT: 12.2 % (ref 11.5–17)
GFR SERPLBLD CREATININE-BSD FMLA CKD-EPI: >60 MLS/MIN/1.73/M2
GLOBULIN SER-MCNC: 3 GM/DL (ref 2.4–3.5)
GLUCOSE SERPL-MCNC: 109 MG/DL (ref 74–100)
HCT VFR BLD AUTO: 29.2 % (ref 42–52)
HGB BLD-MCNC: 9.6 G/DL (ref 14–18)
IMM GRANULOCYTES # BLD AUTO: 0.04 X10(3)/MCL (ref 0–0.04)
IMM GRANULOCYTES NFR BLD AUTO: 0.4 %
LYMPHOCYTES # BLD AUTO: 1.69 X10(3)/MCL (ref 0.6–4.6)
LYMPHOCYTES NFR BLD AUTO: 18.1 %
MCH RBC QN AUTO: 29.9 PG (ref 27–31)
MCHC RBC AUTO-ENTMCNC: 32.9 G/DL (ref 33–36)
MCV RBC AUTO: 91 FL (ref 80–94)
MONOCYTES # BLD AUTO: 1.3 X10(3)/MCL (ref 0.1–1.3)
MONOCYTES NFR BLD AUTO: 13.9 %
NEUTROPHILS # BLD AUTO: 5.83 X10(3)/MCL (ref 2.1–9.2)
NEUTROPHILS NFR BLD AUTO: 62.7 %
NRBC BLD AUTO-RTO: 0 %
PLATELET # BLD AUTO: 280 X10(3)/MCL (ref 130–400)
PMV BLD AUTO: 10.1 FL (ref 7.4–10.4)
POTASSIUM SERPL-SCNC: 4.8 MMOL/L (ref 3.5–5.1)
PROT SERPL-MCNC: 5.9 GM/DL (ref 6.4–8.3)
RBC # BLD AUTO: 3.21 X10(6)/MCL (ref 4.7–6.1)
SODIUM SERPL-SCNC: 138 MMOL/L (ref 136–145)
WBC # SPEC AUTO: 9.32 X10(3)/MCL (ref 4.5–11.5)

## 2023-11-07 PROCEDURE — 63600175 PHARM REV CODE 636 W HCPCS: Performed by: PHYSICIAN ASSISTANT

## 2023-11-07 PROCEDURE — 25000003 PHARM REV CODE 250: Performed by: ORTHOPAEDIC SURGERY

## 2023-11-07 PROCEDURE — 80053 COMPREHEN METABOLIC PANEL: CPT | Performed by: NURSE PRACTITIONER

## 2023-11-07 PROCEDURE — 25000003 PHARM REV CODE 250: Performed by: PHYSICIAN ASSISTANT

## 2023-11-07 PROCEDURE — 85025 COMPLETE CBC W/AUTO DIFF WBC: CPT | Performed by: NURSE PRACTITIONER

## 2023-11-07 PROCEDURE — 25000003 PHARM REV CODE 250: Performed by: NURSE PRACTITIONER

## 2023-11-07 RX ORDER — KETOROLAC TROMETHAMINE 10 MG/1
10 TABLET, FILM COATED ORAL EVERY 6 HOURS PRN
Qty: 20 TABLET | Refills: 0 | Status: SHIPPED | OUTPATIENT
Start: 2023-11-07 | End: 2023-11-12

## 2023-11-07 RX ORDER — OXYCODONE HYDROCHLORIDE 10 MG/1
10 TABLET ORAL EVERY 4 HOURS PRN
Qty: 42 TABLET | Refills: 0 | Status: SHIPPED | OUTPATIENT
Start: 2023-11-07 | End: 2023-11-14

## 2023-11-07 RX ORDER — BACITRACIN 500 [USP'U]/G
OINTMENT TOPICAL
Status: DISCONTINUED | OUTPATIENT
Start: 2023-11-07 | End: 2023-11-07 | Stop reason: HOSPADM

## 2023-11-07 RX ORDER — METHOCARBAMOL 500 MG/1
500 TABLET, FILM COATED ORAL EVERY 8 HOURS
Qty: 30 TABLET | Refills: 0 | Status: SHIPPED | OUTPATIENT
Start: 2023-11-07 | End: 2023-11-16 | Stop reason: SDUPTHER

## 2023-11-07 RX ADMIN — MUPIROCIN: 20 OINTMENT TOPICAL at 09:11

## 2023-11-07 RX ADMIN — ACETAMINOPHEN 650 MG: 325 TABLET, FILM COATED ORAL at 02:11

## 2023-11-07 RX ADMIN — OXYCODONE HYDROCHLORIDE 10 MG: 10 TABLET ORAL at 09:11

## 2023-11-07 RX ADMIN — CEFTRIAXONE SODIUM 2 G: 2 INJECTION, POWDER, FOR SOLUTION INTRAMUSCULAR; INTRAVENOUS at 02:11

## 2023-11-07 RX ADMIN — DOCUSATE SODIUM 100 MG: 100 CAPSULE, LIQUID FILLED ORAL at 09:11

## 2023-11-07 RX ADMIN — METHOCARBAMOL 500 MG: 500 TABLET ORAL at 05:11

## 2023-11-07 RX ADMIN — ACETAMINOPHEN 650 MG: 325 TABLET, FILM COATED ORAL at 09:11

## 2023-11-07 RX ADMIN — BACITRACIN: 500 OINTMENT TOPICAL at 09:11

## 2023-11-07 RX ADMIN — OXYCODONE HYDROCHLORIDE 5 MG: 5 TABLET ORAL at 02:11

## 2023-11-07 RX ADMIN — GABAPENTIN 300 MG: 300 CAPSULE ORAL at 09:11

## 2023-11-07 NOTE — DISCHARGE SUMMARY
Discharge Summary    Admit Date: 11/2/2023     Discharge Date: 11/7/2023     Operative Procedure: INCISION AND DRAINAGE, UPPER EXTREMITY (Left)     History of Present Illness/Hospital Course: Patient admitted end of last week. He underwent I&D of an open left elbow fracture dislocation with internal stabilization device with Dr. Black. Over weekend underwent repeat I&D with radial head replacement. He tolerated well although had some blistering due to surgery. His pain is well controlled today. He is stable for DC home with     Discharge Disposition: Home    Activity: NWB to affected extremity ; ROMAT    Diet: Resume previous home diet    Medications:      Medication List        START taking these medications      ketorolac 10 mg tablet  Commonly known as: TORADOL  Take 1 tablet (10 mg total) by mouth every 6 (six) hours as needed for Pain.     methocarbamoL 500 MG Tab  Commonly known as: ROBAXIN  Take 1 tablet (500 mg total) by mouth every 8 (eight) hours. for 10 days     oxyCODONE 10 mg Tab immediate release tablet  Commonly known as: ROXICODONE  Take 1 tablet (10 mg total) by mouth every 4 (four) hours as needed for Pain.               Where to Get Your Medications        These medications were sent to [facility] [address] [place] [address] Floor 1  [address] [facility] [address] 1, [facility] [address]      Phone: [phone]   ketorolac 10 mg tablet  methocarbamoL 500 MG Tab  oxyCODONE 10 mg Tab immediate release tablet          Discharge Instructions: If in splint, keep clean and dry until follow up. Otherwise daily dry dressing changes until follow up. Keep incisions clean and dry. Do not apply ointments or creams.    Follow Up: Dr. Black in 1 week for wound check.    The above findings, diagnostics, and treatment plan were discussed with Dr. Vieyra who is in agreement with the plan of care except as stated in additional documentation.     Tawana Dutton,  PA-C [facility]   Orthopedic Trauma

## 2023-11-07 NOTE — NURSING
Pt and spouse given dc instructions and verbalized understanding. All questions/concerns addressed. Wound care done. Pt wheeled lisa with all personal belongings in NAD and AAOx4.

## 2023-11-07 NOTE — PLAN OF CARE
11/07/23 0838   Final Note   Assessment Type Final Discharge Note   Anticipated Discharge Disposition Home     Patient's spouse will perform wound care and reports not needing home health. Notified DAJUAN Dutton

## 2023-11-16 ENCOUNTER — OFFICE VISIT (OUTPATIENT)
Dept: ORTHOPEDICS | Facility: CLINIC | Age: 53
End: 2023-11-16
Payer: COMMERCIAL

## 2023-11-16 VITALS
WEIGHT: 199 LBS | BODY MASS INDEX: 29.47 KG/M2 | SYSTOLIC BLOOD PRESSURE: 136 MMHG | HEIGHT: 69 IN | DIASTOLIC BLOOD PRESSURE: 85 MMHG | HEART RATE: 75 BPM

## 2023-11-16 DIAGNOSIS — S52.132F TYPE III OPEN DISPLACED FRACTURE OF NECK OF LEFT RADIUS WITH ROUTINE HEALING, SUBSEQUENT ENCOUNTER: Primary | ICD-10-CM

## 2023-11-16 DIAGNOSIS — S52.132F TYPE III OPEN DISPLACED FRACTURE OF NECK OF LEFT RADIUS WITH ROUTINE HEALING, SUBSEQUENT ENCOUNTER: ICD-10-CM

## 2023-11-16 PROCEDURE — 3075F PR MOST RECENT SYSTOLIC BLOOD PRESS GE 130-139MM HG: ICD-10-PCS | Mod: CPTII,,, | Performed by: ORTHOPAEDIC SURGERY

## 2023-11-16 PROCEDURE — 1160F PR REVIEW ALL MEDS BY PRESCRIBER/CLIN PHARMACIST DOCUMENTED: ICD-10-PCS | Mod: CPTII,,, | Performed by: ORTHOPAEDIC SURGERY

## 2023-11-16 PROCEDURE — 99024 PR POST-OP FOLLOW-UP VISIT: ICD-10-PCS | Mod: ,,, | Performed by: ORTHOPAEDIC SURGERY

## 2023-11-16 PROCEDURE — 1159F MED LIST DOCD IN RCRD: CPT | Mod: CPTII,,, | Performed by: ORTHOPAEDIC SURGERY

## 2023-11-16 PROCEDURE — 1160F RVW MEDS BY RX/DR IN RCRD: CPT | Mod: CPTII,,, | Performed by: ORTHOPAEDIC SURGERY

## 2023-11-16 PROCEDURE — 3079F DIAST BP 80-89 MM HG: CPT | Mod: CPTII,,, | Performed by: ORTHOPAEDIC SURGERY

## 2023-11-16 PROCEDURE — 99024 POSTOP FOLLOW-UP VISIT: CPT | Mod: ,,, | Performed by: ORTHOPAEDIC SURGERY

## 2023-11-16 PROCEDURE — 3079F PR MOST RECENT DIASTOLIC BLOOD PRESSURE 80-89 MM HG: ICD-10-PCS | Mod: CPTII,,, | Performed by: ORTHOPAEDIC SURGERY

## 2023-11-16 PROCEDURE — 1159F PR MEDICATION LIST DOCUMENTED IN MEDICAL RECORD: ICD-10-PCS | Mod: CPTII,,, | Performed by: ORTHOPAEDIC SURGERY

## 2023-11-16 PROCEDURE — 3075F SYST BP GE 130 - 139MM HG: CPT | Mod: CPTII,,, | Performed by: ORTHOPAEDIC SURGERY

## 2023-11-16 RX ORDER — OXYCODONE AND ACETAMINOPHEN 10; 325 MG/1; MG/1
1 TABLET ORAL EVERY 4 HOURS PRN
Qty: 42 TABLET | Refills: 0 | Status: SHIPPED | OUTPATIENT
Start: 2023-11-16 | End: 2023-11-16 | Stop reason: SDUPTHER

## 2023-11-16 RX ORDER — OXYCODONE HYDROCHLORIDE 5 MG/1
10 TABLET ORAL EVERY 4 HOURS PRN
Status: ON HOLD | COMMUNITY
End: 2024-01-24

## 2023-11-16 RX ORDER — METHOCARBAMOL 500 MG/1
500 TABLET, FILM COATED ORAL 3 TIMES DAILY
Qty: 42 TABLET | Refills: 0 | Status: SHIPPED | OUTPATIENT
Start: 2023-11-16 | End: 2023-11-16 | Stop reason: SDUPTHER

## 2023-11-16 NOTE — PROGRESS NOTES
"Subjective:       Patient ID: Fabio Mcclendon is a 53 y.o. male.    Chief Complaint   Patient presents with    Left Elbow - Post-op Evaluation     11 day f/u from I&D, ex- fix, left radial head arthroplasty. Present in soft dressing and sling. Reports intermittent pain .        Patient is here today for follow-up evaluation status post irrigation debridement of open elbow dislocation with radial head fracture status post I and D wound closure and radial head replacement.  He has been doing very well.  Reports no fevers or chills, no drainage from his wounds.  His small fracture blisters have all resolved.  He has pain as expected is requesting a refill on his Percocet and Robaxin.        Review of Systems   Constitutional: Negative for chills, fever and malaise/fatigue.   HENT:  Negative for congestion and hearing loss.    Eyes:  Negative for visual disturbance.   Cardiovascular:  Negative for chest pain and syncope.   Respiratory:  Negative for cough and shortness of breath.    Hematologic/Lymphatic: Does not bruise/bleed easily.   Skin:  Negative for color change and suspicious lesions.   Musculoskeletal:  Negative for falls and neck pain.   Gastrointestinal:  Negative for abdominal pain, nausea and vomiting.   Genitourinary:  Negative for dysuria and hematuria.   Neurological:  Negative for numbness and sensory change.   Psychiatric/Behavioral:  Negative for altered mental status. The patient is not nervous/anxious.         Current Outpatient Medications on File Prior to Visit   Medication Sig Dispense Refill    oxyCODONE (ROXICODONE) 5 MG immediate release tablet Take 10 mg by mouth every 4 (four) hours as needed for Pain.      methocarbamoL (ROBAXIN) 500 MG Tab Take 1 tablet (500 mg total) by mouth every 8 (eight) hours. for 10 days 30 tablet 0     No current facility-administered medications on file prior to visit.          Objective:      /85   Pulse 75   Ht 5' 9" (1.753 m)   Wt 90.3 kg (199 lb)   " BMI 29.39 kg/m²   Physical Exam  Musculoskeletal:      Comments: Left upper extremity:  Surgical incision of the posterior aspect of the humerus healing well.  Traumatic wound to the anterior aspect of the elbow is clean and dry, dry eschar along the wound edge.  No new fracture blisters noted.  All blisters that were present are dry.  He has stiffness with range motion of the elbow as expected as well as with supination pronation.  Distally he has intact EPL FPL, EDC/FDP and interossei.  His sensation light touch in the radial and ulnar distributions are intact.  He has some mild decrease in sensation to the tip of his index and thumb though it is present.  This is unchanged from his previous evaluation in the hospital.        Body mass index is 29.39 kg/m².    Radiology:         Assessment:         1. Type III open displaced fracture of neck of left radius with routine healing, subsequent encounter  Ambulatory referral/consult to Physical/Occupational Therapy              Plan:       He remains intact distally though he does have some mild decrease in sensation in the median nerve distribution.  He has no signs or symptoms of infection.  His elbow is stable and concentric with gentle range motion through a short arc.  We will provide him with orders for physical therapy to begin range motion exercises of the elbow wrist and digits.  Remain nonweightbearing to the left upper extremity.  Sling for comfort.  I will have him come back in 1 week for removal of his staples and sutures.  Refill pain medication Robaxin today.  They are happy with this plan of care all questions and concerns were addressed.      This note/OR report was created with the assistance of  voice recognition software or phone  dictation.  There may be transcription errors as a result of using this technology however minimal. Effort has been made to assure accuracy of transcription but any obvious errors or omissions should be clarified with the  author of the document.       Marco Black MD  Orthopedic Trauma  [facility]      Follow up in about 1 week (around 11/23/2023).    Type III open displaced fracture of neck of left radius with routine healing, subsequent encounter  -     Ambulatory referral/consult to Physical/Occupational Therapy; Future; Expected date: 11/23/2023              Orders Placed This Encounter   Procedures    Ambulatory referral/consult to Physical/Occupational Therapy     Standing Status:   Future     Standing Expiration Date:   12/16/2024     Referral Priority:   Routine     Referral Type:   Physical Medicine     Referral Reason:   Specialty Services Required     Requested Specialty:   Physical Therapy     Number of Visits Requested:   1       Future Appointments   Date Time Provider Department Center   11/22/2023  9:00 AM Marco Black MD [facility]

## 2023-11-17 RX ORDER — METHOCARBAMOL 500 MG/1
500 TABLET, FILM COATED ORAL 3 TIMES DAILY
Qty: 42 TABLET | Refills: 0 | Status: SHIPPED | OUTPATIENT
Start: 2023-11-17 | End: 2023-11-22 | Stop reason: SDUPTHER

## 2023-11-17 RX ORDER — OXYCODONE AND ACETAMINOPHEN 10; 325 MG/1; MG/1
1 TABLET ORAL EVERY 4 HOURS PRN
Qty: 42 TABLET | Refills: 0 | Status: SHIPPED | OUTPATIENT
Start: 2023-11-17 | End: 2023-11-22 | Stop reason: SDUPTHER

## 2023-11-22 ENCOUNTER — OFFICE VISIT (OUTPATIENT)
Dept: ORTHOPEDICS | Facility: CLINIC | Age: 53
End: 2023-11-22
Payer: COMMERCIAL

## 2023-11-22 VITALS
WEIGHT: 199.06 LBS | HEART RATE: 70 BPM | SYSTOLIC BLOOD PRESSURE: 131 MMHG | HEIGHT: 69 IN | BODY MASS INDEX: 29.48 KG/M2 | DIASTOLIC BLOOD PRESSURE: 88 MMHG | RESPIRATION RATE: 18 BRPM

## 2023-11-22 DIAGNOSIS — S52.132F TYPE III OPEN DISPLACED FRACTURE OF NECK OF LEFT RADIUS WITH ROUTINE HEALING, SUBSEQUENT ENCOUNTER: Primary | ICD-10-CM

## 2023-11-22 PROCEDURE — 99024 PR POST-OP FOLLOW-UP VISIT: ICD-10-PCS | Mod: ,,,

## 2023-11-22 PROCEDURE — 3079F DIAST BP 80-89 MM HG: CPT | Mod: CPTII,,,

## 2023-11-22 PROCEDURE — 3079F PR MOST RECENT DIASTOLIC BLOOD PRESSURE 80-89 MM HG: ICD-10-PCS | Mod: CPTII,,,

## 2023-11-22 PROCEDURE — 1159F PR MEDICATION LIST DOCUMENTED IN MEDICAL RECORD: ICD-10-PCS | Mod: CPTII,,,

## 2023-11-22 PROCEDURE — 3075F PR MOST RECENT SYSTOLIC BLOOD PRESS GE 130-139MM HG: ICD-10-PCS | Mod: CPTII,,,

## 2023-11-22 PROCEDURE — 1159F MED LIST DOCD IN RCRD: CPT | Mod: CPTII,,,

## 2023-11-22 PROCEDURE — 3075F SYST BP GE 130 - 139MM HG: CPT | Mod: CPTII,,,

## 2023-11-22 PROCEDURE — 99024 POSTOP FOLLOW-UP VISIT: CPT | Mod: ,,,

## 2023-11-22 RX ORDER — OXYCODONE AND ACETAMINOPHEN 10; 325 MG/1; MG/1
1 TABLET ORAL EVERY 4 HOURS PRN
Qty: 30 TABLET | Refills: 0 | Status: SHIPPED | OUTPATIENT
Start: 2023-11-22 | End: 2023-11-29

## 2023-11-22 RX ORDER — METHOCARBAMOL 500 MG/1
500 TABLET, FILM COATED ORAL 3 TIMES DAILY
Qty: 42 TABLET | Refills: 0 | Status: SHIPPED | OUTPATIENT
Start: 2023-11-22 | End: 2023-12-20 | Stop reason: SDUPTHER

## 2023-11-22 RX ORDER — GABAPENTIN 300 MG/1
300 CAPSULE ORAL 3 TIMES DAILY
Qty: 42 CAPSULE | Refills: 0 | Status: SHIPPED | OUTPATIENT
Start: 2023-11-22 | End: 2023-12-07 | Stop reason: SDUPTHER

## 2023-11-22 NOTE — PROGRESS NOTES
Orthopaedic Clinic  Orthopedic Clinic Note      Chief Complaint:   Chief Complaint   Patient presents with    Left Elbow - Follow-up     2 WEEK F/U LEFT RADIAL HEAD ARTHROPLASTY, I&D, EX-FIX.       Referring Physician: No ref. provider found      History of Present Illness:    This is a 53 y.o. year old male presenting 2 weeks status post left upper extremity irrigation and debridement with left radial head arthroplasty.  He is doing well with no major issues or concerns.  He has been compliant with nonweightbearing to the operative extremity and wearing a resting arm sling.  He does continue to have some shocking/shooting pain in the operative extremity.  This pain is most noticeable at night.  Otherwise, pain well controlled.      History reviewed. No pertinent past medical history.    Past Surgical History:   Procedure Laterality Date    INCISION AND DRAINAGE, UPPER EXTREMITY Left 11/3/2023    Procedure: INCISION AND DRAINAGE, UPPER EXTREMITY;  Surgeon: Marco Black MD;  Location: [facility];  Service: Orthopedics;  Laterality: Left;    INCISION AND DRAINAGE, UPPER EXTREMITY Left 11/5/2023    Procedure: INCISION AND DRAINAGE, UPPER EXTREMITY;  Surgeon: Lucian Vieyra DO;  Location: [facility];  Service: Orthopedics;  Laterality: Left;  supine vascular bed c arm skeletal dynamics wash stuff    OPEN REDUCTION AND INTERNAL FIXATION (ORIF) OF INJURY OF ELBOW Left 11/3/2023    Procedure: ORIF, ELBOW;  Surgeon: Marco Black MD;  Location: [facility];  Service: Orthopedics;  Laterality: Left;       Current Outpatient Medications   Medication Sig    gabapentin (NEURONTIN) 300 MG capsule Take 1 capsule (300 mg total) by mouth 3 (three) times daily. for 14 days    methocarbamoL (ROBAXIN) 500 MG Tab Take 1 tablet (500 mg total) by mouth 3 (three) times daily. for 14 days    oxyCODONE (ROXICODONE) 5 MG immediate release tablet Take 10 mg by mouth every 4 (four) hours as needed for Pain.    oxyCODONE-acetaminophen  "(PERCOCET)  mg per tablet Take 1 tablet by mouth every 4 (four) hours as needed for Pain.     No current facility-administered medications for this visit.       Review of patient's allergies indicates:   Allergen Reactions    Levaquin [levofloxacin] Rash    Opioids - morphine analogues Rash       History reviewed. No pertinent family history.    Social History     Socioeconomic History    Marital status:    Tobacco Use    Smoking status: Never    Smokeless tobacco: Never   Substance and Sexual Activity    Alcohol use: Not Currently           Review of Systems:  All review of systems negative except for those stated in the HPI.    Examination:    Vital Signs:    Vitals:    11/22/23 0903   BP: 131/88   Pulse: 70   Resp: 18   Weight: 90.3 kg (199 lb 1.2 oz)   Height: 5' 9" (1.753 m)       Body mass index is 29.4 kg/m².    Physical Examination:  General: Well-developed, well-nourished.  Neuro: Alert and oriented x 3.  Psych: Normal mood and affect.  Card: Regular rate and rhythm  Resp: Respirations regular and unlabored  Left upper extremity:  Surgical incisions and traumatic wounds all healing well with wound edges well approximated.  Sutures and staples intact with no evidence of infection.  Swelling improved.  He has stiffness with range motion of the elbow as expected as well as with supination pronation.  Distally he has intact EPL FPL, EDC/FDP and interossei.  His sensation light touch in the radial and ulnar distributions are intact.  He has some mild decrease in sensation to the tip of his index and thumb though it is present.  This is unchanged from his previous evaluation in the hospital.  The hand and digits are warm/pink and appear well perfused with palpable radial pulse.      Imaging: None      Assessment: Type III open displaced fracture of neck of left radius with routine healing, subsequent encounter  -     methocarbamoL (ROBAXIN) 500 MG Tab; Take 1 tablet (500 mg total) by mouth 3 (three) " times daily. for 14 days  Dispense: 42 tablet; Refill: 0  -     oxyCODONE-acetaminophen (PERCOCET)  mg per tablet; Take 1 tablet by mouth every 4 (four) hours as needed for Pain.  Dispense: 30 tablet; Refill: 0  -     gabapentin (NEURONTIN) 300 MG capsule; Take 1 capsule (300 mg total) by mouth 3 (three) times daily. for 14 days  Dispense: 42 capsule; Refill: 0        Plan:  Fortunately, surgical incisions and traumatic wounds all healing well despite the extensive injury to the soft tissue structures.  All sutures and staples removed today.  Patient can begin formal physical therapy to work on range of motion of the operative extremity.  He will remain nonweightbearing to the left upper extremity.  Continue resting arm sling as needed for comfort.  Prescriptions routed for refills of Percocet and Robaxin.  New prescription routed for gabapentin to be taken 3 times daily.  Advised that he start this new prescription at night secondary to increased symptoms.  He will return to clinic in approximately 4 weeks for re-evaluation with repeat x-rays.  He verbalized understanding of the plan of care with no further questions.    The above findings, diagnostics, and treatment plan were discussed with Marco Black MD who personally examined the patient and is in agreement with the plan of care.         Follow up in about 4 weeks (around 12/20/2023) for Reevaluation with repeat X-rays.      DISCLAIMER: This note may have been dictated using voice recognition software and may contain grammatical errors.     NOTE: Consult report sent to referring provider via [facility].

## 2023-12-07 DIAGNOSIS — S52.132F TYPE III OPEN DISPLACED FRACTURE OF NECK OF LEFT RADIUS WITH ROUTINE HEALING, SUBSEQUENT ENCOUNTER: ICD-10-CM

## 2023-12-07 RX ORDER — GABAPENTIN 300 MG/1
300 CAPSULE ORAL 3 TIMES DAILY
Qty: 90 CAPSULE | Refills: 0 | Status: SHIPPED | OUTPATIENT
Start: 2023-12-07 | End: 2023-12-20 | Stop reason: SDUPTHER

## 2023-12-20 ENCOUNTER — HOSPITAL ENCOUNTER (OUTPATIENT)
Dept: RADIOLOGY | Facility: CLINIC | Age: 53
Discharge: HOME | End: 2023-12-20
Attending: ORTHOPAEDIC SURGERY
Payer: COMMERCIAL

## 2023-12-20 ENCOUNTER — OFFICE VISIT (OUTPATIENT)
Dept: ORTHOPEDICS | Facility: CLINIC | Age: 53
End: 2023-12-20
Payer: COMMERCIAL

## 2023-12-20 VITALS
HEIGHT: 69 IN | DIASTOLIC BLOOD PRESSURE: 83 MMHG | RESPIRATION RATE: 18 BRPM | BODY MASS INDEX: 29.48 KG/M2 | HEART RATE: 67 BPM | SYSTOLIC BLOOD PRESSURE: 143 MMHG | WEIGHT: 199.06 LBS

## 2023-12-20 DIAGNOSIS — S52.132F TYPE III OPEN DISPLACED FRACTURE OF NECK OF LEFT RADIUS WITH ROUTINE HEALING, SUBSEQUENT ENCOUNTER: Primary | ICD-10-CM

## 2023-12-20 DIAGNOSIS — S52.132F TYPE III OPEN DISPLACED FRACTURE OF NECK OF LEFT RADIUS WITH ROUTINE HEALING, SUBSEQUENT ENCOUNTER: ICD-10-CM

## 2023-12-20 PROCEDURE — 3079F DIAST BP 80-89 MM HG: CPT | Mod: CPTII,,, | Performed by: ORTHOPAEDIC SURGERY

## 2023-12-20 PROCEDURE — 3077F PR MOST RECENT SYSTOLIC BLOOD PRESSURE >= 140 MM HG: ICD-10-PCS | Mod: CPTII,,, | Performed by: ORTHOPAEDIC SURGERY

## 2023-12-20 PROCEDURE — 73080 X-RAY EXAM OF ELBOW: CPT | Mod: LT,,, | Performed by: ORTHOPAEDIC SURGERY

## 2023-12-20 PROCEDURE — 3079F PR MOST RECENT DIASTOLIC BLOOD PRESSURE 80-89 MM HG: ICD-10-PCS | Mod: CPTII,,, | Performed by: ORTHOPAEDIC SURGERY

## 2023-12-20 PROCEDURE — 1159F MED LIST DOCD IN RCRD: CPT | Mod: CPTII,,, | Performed by: ORTHOPAEDIC SURGERY

## 2023-12-20 PROCEDURE — 99024 PR POST-OP FOLLOW-UP VISIT: ICD-10-PCS | Mod: ,,, | Performed by: ORTHOPAEDIC SURGERY

## 2023-12-20 PROCEDURE — 3077F SYST BP >= 140 MM HG: CPT | Mod: CPTII,,, | Performed by: ORTHOPAEDIC SURGERY

## 2023-12-20 PROCEDURE — 99024 POSTOP FOLLOW-UP VISIT: CPT | Mod: ,,, | Performed by: ORTHOPAEDIC SURGERY

## 2023-12-20 PROCEDURE — 1159F PR MEDICATION LIST DOCUMENTED IN MEDICAL RECORD: ICD-10-PCS | Mod: CPTII,,, | Performed by: ORTHOPAEDIC SURGERY

## 2023-12-20 PROCEDURE — 1160F PR REVIEW ALL MEDS BY PRESCRIBER/CLIN PHARMACIST DOCUMENTED: ICD-10-PCS | Mod: CPTII,,, | Performed by: ORTHOPAEDIC SURGERY

## 2023-12-20 PROCEDURE — 73080 XR ELBOW COMPLETE 3 VIEW LEFT: ICD-10-PCS | Mod: LT,,, | Performed by: ORTHOPAEDIC SURGERY

## 2023-12-20 PROCEDURE — 1160F RVW MEDS BY RX/DR IN RCRD: CPT | Mod: CPTII,,, | Performed by: ORTHOPAEDIC SURGERY

## 2023-12-20 RX ORDER — OXYCODONE HYDROCHLORIDE 10 MG/1
10 TABLET ORAL EVERY 6 HOURS PRN
Qty: 28 TABLET | Refills: 0 | Status: SHIPPED | OUTPATIENT
Start: 2023-12-20 | End: 2023-12-27

## 2023-12-20 RX ORDER — METHOCARBAMOL 750 MG/1
750 TABLET, FILM COATED ORAL 3 TIMES DAILY
Qty: 90 TABLET | Refills: 0 | Status: SHIPPED | OUTPATIENT
Start: 2023-12-20 | End: 2024-01-17 | Stop reason: SDUPTHER

## 2023-12-20 RX ORDER — GABAPENTIN 300 MG/1
CAPSULE ORAL
Qty: 120 CAPSULE | Refills: 0 | Status: SHIPPED | OUTPATIENT
Start: 2023-12-20 | End: 2024-01-17 | Stop reason: SDUPTHER

## 2023-12-20 NOTE — PROGRESS NOTES
Subjective:       Patient ID: Fabio Mcclendon is a 53 y.o. male.    Chief Complaint   Patient presents with    Left Elbow - Follow-up     6 week f/u I&D left radial head arthroplasty.  In sling, reports increased rom.          Patient is here today for follow-up evaluation 6 weeks status post repair of traumatic left elbow open dislocation with radial head replacement and application of internal joint stabilizer.  He has been working with physical therapy and has had an improvement in his range of motion.  He continues to have some stiffness and pain.  He was taking less of the narcotic pain medication but is asking for refill today as well as a refill of gabapentin and muscle relaxers.  He has been compliant with his nonweightbearing status to the left upper extremity.    Follow-up  Pertinent negatives include no abdominal pain, chest pain, chills, congestion, coughing, fever, nausea, neck pain, numbness or vomiting.       Review of Systems   Constitutional: Negative for chills, fever and malaise/fatigue.   HENT:  Negative for congestion and hearing loss.    Eyes:  Negative for visual disturbance.   Cardiovascular:  Negative for chest pain and syncope.   Respiratory:  Negative for cough and shortness of breath.    Hematologic/Lymphatic: Does not bruise/bleed easily.   Skin:  Negative for color change and suspicious lesions.   Musculoskeletal:  Negative for falls and neck pain.   Gastrointestinal:  Negative for abdominal pain, nausea and vomiting.   Genitourinary:  Negative for dysuria and hematuria.   Neurological:  Negative for numbness and sensory change.   Psychiatric/Behavioral:  Negative for altered mental status. The patient is not nervous/anxious.         Current Outpatient Medications on File Prior to Visit   Medication Sig Dispense Refill    gabapentin (NEURONTIN) 300 MG capsule Take 1 capsule (300 mg total) by mouth 3 (three) times daily. 90 capsule 0    oxyCODONE (ROXICODONE) 5 MG immediate release  "tablet Take 10 mg by mouth every 4 (four) hours as needed for Pain.       No current facility-administered medications on file prior to visit.          Objective:      BP (!) 143/83   Pulse 67   Resp 18   Ht 5' 9" (1.753 m)   Wt 90.3 kg (199 lb 1.2 oz)   BMI 29.40 kg/m²   Physical Exam  Constitutional:       General: He is not in acute distress.     Appearance: Normal appearance. He is not ill-appearing.   HENT:      Head: Normocephalic and atraumatic.      Nose: No congestion.   Eyes:      Extraocular Movements: Extraocular movements intact.   Cardiovascular:      Rate and Rhythm: Normal rate and regular rhythm.      Pulses: Normal pulses.   Pulmonary:      Effort: Pulmonary effort is normal.      Breath sounds: Normal breath sounds.   Abdominal:      General: There is no distension.      Palpations: Abdomen is soft.      Tenderness: There is no abdominal tenderness.   Musculoskeletal:      Comments: Left upper extremity:  Traumatic wound to the antecubital fossa is healing well, small amount of dry eschar is still well adherent, no surrounding erythema, no purulence.  Surgical incision to the dorsal aspect of the elbow is well healed.  No painful or prominent hardware.  He currently has range motion of the elbow 35° to 105°.  He has some stiffness with dorsiflexion and volar flexion of the wrist, intact EPL/FPL, EDC/FDP and interossei.  2+ radial pulse.   Skin:     General: Skin is warm and dry.   Neurological:      Mental Status: He is alert and oriented to person, place, and time. Mental status is at baseline.   Psychiatric:         Mood and Affect: Mood normal.         Behavior: Behavior normal.         Thought Content: Thought content normal.         Judgment: Judgment normal.        Body mass index is 29.4 kg/m².    Radiology:   Left elbow three views:  Prosthesis intact.  IJS in place.  Elbow is concentrically reduced.      Assessment:         1. Type III open displaced fracture of neck of left radius " with routine healing, subsequent encounter  X-Ray Elbow Complete Left              Plan:       We will clarify his orders physical therapy, he can perform full range of motion of the elbow forearm wrist and digits.  Needs to work on improving his flexion at the elbow.  He has no mechanical block to motion.  We can advance his lifting, pushing, pulling to 10 lb.  We will refill his medication today.  I will see him back in a month at which time we will plan to remove his internal joint stabilizer.  They are happy with this plan of care today and all questions and concerns were addressed.      This note/OR report was created with the assistance of  voice recognition software or phone  dictation.  There may be transcription errors as a result of using this technology however minimal. Effort has been made to assure accuracy of transcription but any obvious errors or omissions should be clarified with the author of the document.       Marco Black MD  Orthopedic Trauma  [facility]      Follow up in about 4 weeks (around 1/17/2024).    Type III open displaced fracture of neck of left radius with routine healing, subsequent encounter  -     X-Ray Elbow Complete Left; Future; Expected date: 12/20/2023              Orders Placed This Encounter   Procedures    X-Ray Elbow Complete Left     Standing Status:   Future     Number of Occurrences:   1     Standing Expiration Date:   12/18/2024     Order Specific Question:   May the Radiologist modify the order per protocol to meet the clinical needs of the patient?     Answer:   Yes     Order Specific Question:   Release to patient     Answer:   Immediate       No future appointments.

## 2024-01-17 ENCOUNTER — OFFICE VISIT (OUTPATIENT)
Dept: ORTHOPEDICS | Facility: CLINIC | Age: 54
End: 2024-01-17
Payer: COMMERCIAL

## 2024-01-17 ENCOUNTER — LAB VISIT (OUTPATIENT)
Dept: LAB | Facility: HOSPITAL | Age: 54
End: 2024-01-17
Attending: ORTHOPAEDIC SURGERY
Payer: COMMERCIAL

## 2024-01-17 ENCOUNTER — HOSPITAL ENCOUNTER (OUTPATIENT)
Dept: RADIOLOGY | Facility: CLINIC | Age: 54
Discharge: HOME | End: 2024-01-17
Attending: ORTHOPAEDIC SURGERY
Payer: COMMERCIAL

## 2024-01-17 VITALS
HEIGHT: 69 IN | RESPIRATION RATE: 18 BRPM | WEIGHT: 199.06 LBS | SYSTOLIC BLOOD PRESSURE: 139 MMHG | BODY MASS INDEX: 29.48 KG/M2 | HEART RATE: 67 BPM | DIASTOLIC BLOOD PRESSURE: 88 MMHG

## 2024-01-17 DIAGNOSIS — Z01.818 PREOP TESTING: ICD-10-CM

## 2024-01-17 DIAGNOSIS — S52.132F TYPE III OPEN DISPLACED FRACTURE OF NECK OF LEFT RADIUS WITH ROUTINE HEALING, SUBSEQUENT ENCOUNTER: ICD-10-CM

## 2024-01-17 DIAGNOSIS — S52.132F TYPE III OPEN DISPLACED FRACTURE OF NECK OF LEFT RADIUS WITH ROUTINE HEALING, SUBSEQUENT ENCOUNTER: Primary | ICD-10-CM

## 2024-01-17 LAB
ALBUMIN SERPL-MCNC: 4.2 G/DL (ref 3.5–5)
ALBUMIN/GLOB SERPL: 1.2 RATIO (ref 1.1–2)
ALP SERPL-CCNC: 73 UNIT/L (ref 40–150)
ALT SERPL-CCNC: 30 UNIT/L (ref 0–55)
AST SERPL-CCNC: 23 UNIT/L (ref 5–34)
BASOPHILS # BLD AUTO: 0.06 X10(3)/MCL
BASOPHILS NFR BLD AUTO: 0.7 %
BILIRUB SERPL-MCNC: 0.4 MG/DL
BUN SERPL-MCNC: 16.8 MG/DL (ref 8.4–25.7)
CALCIUM SERPL-MCNC: 9.6 MG/DL (ref 8.4–10.2)
CHLORIDE SERPL-SCNC: 104 MMOL/L (ref 98–107)
CO2 SERPL-SCNC: 28 MMOL/L (ref 22–29)
CREAT SERPL-MCNC: 0.87 MG/DL (ref 0.73–1.18)
EOSINOPHIL # BLD AUTO: 0.31 X10(3)/MCL (ref 0–0.9)
EOSINOPHIL NFR BLD AUTO: 3.7 %
ERYTHROCYTE [DISTWIDTH] IN BLOOD BY AUTOMATED COUNT: 12.8 % (ref 11.5–17)
GFR SERPLBLD CREATININE-BSD FMLA CKD-EPI: >60 MLS/MIN/1.73/M2
GLOBULIN SER-MCNC: 3.4 GM/DL (ref 2.4–3.5)
GLUCOSE SERPL-MCNC: 90 MG/DL (ref 74–100)
HCT VFR BLD AUTO: 44.3 % (ref 42–52)
HGB BLD-MCNC: 14.9 G/DL (ref 14–18)
IMM GRANULOCYTES # BLD AUTO: 0.03 X10(3)/MCL (ref 0–0.04)
IMM GRANULOCYTES NFR BLD AUTO: 0.4 %
LYMPHOCYTES # BLD AUTO: 2.01 X10(3)/MCL (ref 0.6–4.6)
LYMPHOCYTES NFR BLD AUTO: 24.3 %
MCH RBC QN AUTO: 30 PG (ref 27–31)
MCHC RBC AUTO-ENTMCNC: 33.6 G/DL (ref 33–36)
MCV RBC AUTO: 89.3 FL (ref 80–94)
MONOCYTES # BLD AUTO: 0.96 X10(3)/MCL (ref 0.1–1.3)
MONOCYTES NFR BLD AUTO: 11.6 %
NEUTROPHILS # BLD AUTO: 4.91 X10(3)/MCL (ref 2.1–9.2)
NEUTROPHILS NFR BLD AUTO: 59.3 %
NRBC BLD AUTO-RTO: 0 %
PLATELET # BLD AUTO: 259 X10(3)/MCL (ref 130–400)
PMV BLD AUTO: 10.1 FL (ref 7.4–10.4)
POTASSIUM SERPL-SCNC: 4.7 MMOL/L (ref 3.5–5.1)
PROT SERPL-MCNC: 7.6 GM/DL (ref 6.4–8.3)
RBC # BLD AUTO: 4.96 X10(6)/MCL (ref 4.7–6.1)
SODIUM SERPL-SCNC: 140 MMOL/L (ref 136–145)
WBC # SPEC AUTO: 8.28 X10(3)/MCL (ref 4.5–11.5)

## 2024-01-17 PROCEDURE — 99024 POSTOP FOLLOW-UP VISIT: CPT | Mod: ,,, | Performed by: ORTHOPAEDIC SURGERY

## 2024-01-17 PROCEDURE — 1160F RVW MEDS BY RX/DR IN RCRD: CPT | Mod: CPTII,,, | Performed by: ORTHOPAEDIC SURGERY

## 2024-01-17 PROCEDURE — 73080 X-RAY EXAM OF ELBOW: CPT | Mod: LT,,, | Performed by: ORTHOPAEDIC SURGERY

## 2024-01-17 PROCEDURE — 36415 COLL VENOUS BLD VENIPUNCTURE: CPT

## 2024-01-17 PROCEDURE — 80053 COMPREHEN METABOLIC PANEL: CPT

## 2024-01-17 PROCEDURE — 3075F SYST BP GE 130 - 139MM HG: CPT | Mod: CPTII,,, | Performed by: ORTHOPAEDIC SURGERY

## 2024-01-17 PROCEDURE — 3079F DIAST BP 80-89 MM HG: CPT | Mod: CPTII,,, | Performed by: ORTHOPAEDIC SURGERY

## 2024-01-17 PROCEDURE — 1159F MED LIST DOCD IN RCRD: CPT | Mod: CPTII,,, | Performed by: ORTHOPAEDIC SURGERY

## 2024-01-17 PROCEDURE — 85025 COMPLETE CBC W/AUTO DIFF WBC: CPT

## 2024-01-17 RX ORDER — MUPIROCIN 20 MG/G
OINTMENT TOPICAL
Status: CANCELLED | OUTPATIENT
Start: 2024-01-17

## 2024-01-17 RX ORDER — METHOCARBAMOL 750 MG/1
750 TABLET, FILM COATED ORAL 3 TIMES DAILY
Qty: 90 TABLET | Refills: 0 | Status: SHIPPED | OUTPATIENT
Start: 2024-01-17 | End: 2024-02-08 | Stop reason: SDUPTHER

## 2024-01-17 RX ORDER — GABAPENTIN 300 MG/1
CAPSULE ORAL
Qty: 120 CAPSULE | Refills: 0 | Status: SHIPPED | OUTPATIENT
Start: 2024-01-17 | End: 2024-02-08 | Stop reason: SDUPTHER

## 2024-01-17 NOTE — H&P (VIEW-ONLY)
Subjective:       Patient ID: Fabio Mcclendon is a 53 y.o. male.    Chief Complaint   Patient presents with    Left Elbow - Follow-up     10 WEEK F/U LEFT RADIAL HEAD ARTHROPLASTY.  REPORTS IMPROVEMENT.        Patient is here today just over 2 months status post irrigation debridement of left open elbow fracture dislocation with left radial head replacement.  He had placement of an internal external fixator for elbow stability.  He is coming today for repeat clinical evaluation and to discuss removal of his IJS device.    Follow-up  Pertinent negatives include no abdominal pain, chest pain, chills, congestion, coughing, fever, nausea, neck pain, numbness or vomiting.       Review of Systems   Constitutional: Negative for chills, fever and malaise/fatigue.   HENT:  Negative for congestion and hearing loss.    Eyes:  Negative for visual disturbance.   Cardiovascular:  Negative for chest pain and syncope.   Respiratory:  Negative for cough and shortness of breath.    Hematologic/Lymphatic: Does not bruise/bleed easily.   Skin:  Negative for color change and suspicious lesions.   Musculoskeletal:  Negative for falls and neck pain.   Gastrointestinal:  Negative for abdominal pain, nausea and vomiting.   Genitourinary:  Negative for dysuria and hematuria.   Neurological:  Negative for numbness and sensory change.   Psychiatric/Behavioral:  Negative for altered mental status. The patient is not nervous/anxious.         Current Outpatient Medications on File Prior to Visit   Medication Sig Dispense Refill    gabapentin (NEURONTIN) 300 MG capsule Take 1 capsule (300 mg total) by mouth 2 (two) times daily AND 2 capsules (600 mg total) every evening. 120 capsule 0    methocarbamoL (ROBAXIN) 750 MG Tab Take 1 tablet (750 mg total) by mouth 3 (three) times daily. 90 tablet 0    oxyCODONE (ROXICODONE) 5 MG immediate release tablet Take 10 mg by mouth every 4 (four) hours as needed for Pain.       No current facility-administered  "medications on file prior to visit.          Objective:      /88   Pulse 67   Resp 18   Ht 5' 9" (1.753 m)   Wt 90.3 kg (199 lb 1.2 oz)   BMI 29.40 kg/m²   Physical Exam  Constitutional:       General: He is not in acute distress.     Appearance: Normal appearance. He is not ill-appearing.   HENT:      Head: Normocephalic and atraumatic.      Nose: No congestion.   Eyes:      Extraocular Movements: Extraocular movements intact.   Cardiovascular:      Rate and Rhythm: Normal rate and regular rhythm.      Pulses: Normal pulses.   Pulmonary:      Effort: Pulmonary effort is normal.      Breath sounds: Normal breath sounds.   Abdominal:      General: There is no distension.      Palpations: Abdomen is soft.      Tenderness: There is no abdominal tenderness.   Musculoskeletal:      Comments: Left upper extremity:  Patient lacks approximately 15° of full extension.  Flexion 110°.  He lacks approximately 60° of full supination actively, near full pronation.  His hardware is palpable over the elbow not painful or prominent.  Neurovascularly he is intact distally.   Skin:     General: Skin is warm and dry.   Neurological:      Mental Status: He is alert and oriented to person, place, and time. Mental status is at baseline.   Psychiatric:         Mood and Affect: Mood normal.         Behavior: Behavior normal.         Thought Content: Thought content normal.         Judgment: Judgment normal.        Body mass index is 29.4 kg/m².    Radiology:   Left elbow three views:  Hardware intact.  Alignment maintained.  Elbow joint is concentrically reduced.      Assessment:         1. Type III open displaced fracture of neck of left radius with routine healing, subsequent encounter  X-Ray Elbow Complete Left    CBC Auto Differential    Comprehensive Metabolic Panel      2. Preop testing  CBC Auto Differential    Comprehensive Metabolic Panel              Plan:       He is doing well today and I am happy with his progress.  " He has received a Luz Elena splint from PT and is working on improving his motion.  We will plan to remove his IJ S device next week and perform manipulation of the elbow under anesthesia.  He can continue PT. plan to take him to the operating room in 1 week. The risks, benefits and alternatives treatment were discussed at length with the patient today including but not limited to pain, bleeding, scarring, infection, damage to neurovascular structures, malunion/nonunion, hardware failure/irritation, need for future procedures and complications leading to amputation and even death.  All questions and concerns were addressed and the patient understands and agrees with our plan.      This note/OR report was created with the assistance of  voice recognition software or phone  dictation.  There may be transcription errors as a result of using this technology however minimal. Effort has been made to assure accuracy of transcription but any obvious errors or omissions should be clarified with the author of the document.         Marco Black MD  Orthopedic Trauma  [facility]      No follow-ups on file.    Type III open displaced fracture of neck of left radius with routine healing, subsequent encounter  -     X-Ray Elbow Complete Left; Future; Expected date: 01/17/2024  -     CBC Auto Differential; Future; Expected date: 01/17/2024  -     Comprehensive Metabolic Panel; Future; Expected date: 01/17/2024    Preop testing  -     CBC Auto Differential; Future; Expected date: 01/17/2024  -     Comprehensive Metabolic Panel; Future; Expected date: 01/17/2024              Orders Placed This Encounter   Procedures    X-Ray Elbow Complete Left     Standing Status:   Future     Number of Occurrences:   1     Standing Expiration Date:   1/12/2025     Order Specific Question:   May the Radiologist modify the order per protocol to meet the clinical needs of the patient?     Answer:   Yes     Order Specific Question:    Release to patient     Answer:   Immediate    CBC Auto Differential     Standing Status:   Future     Standing Expiration Date:   3/17/2025    Comprehensive Metabolic Panel     Standing Status:   Future     Standing Expiration Date:   3/17/2025       No future appointments.

## 2024-01-19 ENCOUNTER — ANESTHESIA EVENT (OUTPATIENT)
Dept: SURGERY | Facility: HOSPITAL | Age: 54
End: 2024-01-19
Payer: COMMERCIAL

## 2024-01-23 ENCOUNTER — PATIENT MESSAGE (OUTPATIENT)
Dept: ADMINISTRATIVE | Facility: OTHER | Age: 54
End: 2024-01-23
Payer: COMMERCIAL

## 2024-01-23 NOTE — PRE-PROCEDURE INSTRUCTIONS
"[facility]: Outpatient Surgery  Preprocedure Instructions    Expectations: "Because of inconsistent procedure completion times, an unexpected wait may occur. The physicians would like you to be here to prepare in the event they run ahead of time. We will make you as comfortable as possible and keep you informed. We apologize in advance if this happens."     Your arrival time for your surgery or procedure is __5 am____.  We ask patients to arrive about 2 hours before surgery to allow for enough time to review your health history & medications, start your IV, complete any outstanding labwork or tests, and meet your Anesthesiologist.    We are located at [facility], [address] [place] [address].    Enter through the [place] entrance next to the Emergency Room, and come to the 6th floor to the Outpatient Surgery Department.    If you are in need of a wheelchair the morning of surgery please call [phone] about 15 minutes before you arrive. Parking is available in our parking garage located off [facility], between the hospital and [facility].      Visitory Policy:  You are allowed 2 adult visitors to be with you in the hospital. Please, no switching visitors in pre-op area. All hospital visitors should be in good current health.  No small children.  We will update you and your family hourly on the progression of surgery and any unexpected delays.      What to Bring:  Please have your ID, insurance cards, and all home medication bottles with you at check in.  Bring your CPAP machine if one is used at home.     Fasting:  Nothing to eat or drink after midnight the night before your procedure. This includes no ice, gum, hard candies, and/or tobacco products.    Medications:  Follow your doctor's instructions for taking any medications on the morning of your procedure.  If no instructions for taking medications were given, do not take any medications but bring your " medications in their bottles to your procedure check in.     Follow your doctor's preoperative instructions regarding skin prep, bowel prep, bathing, or showering prior to your procedure.  If any special soaps were provided to you, please use according to your doctor's instructions. If no instructions were given from your doctor, take a good bath or shower with antibacterial soap the night before and the morning of your procedure.  On the morning of procedure, wear loose, comfortable clothing.  No lotions, makeup, perfumes, colognes, deodorant, or jewelry to your procedure.  Removable items (glasses, contact lenses, dentures, retainers, hearing aids) need to be removed for your procedure.  Bring your storage containers for these items if you wear them.     Artificial nails, body jewelry, eyelash extensions, and/or hair extensions with metal clips are not allowed during your surgery.  If you currently wear any of these items, please arrange for them to be removed prior to your arrival to the hospital.     Outpatient or Same Day Surgeries:  Any patients receiving sedation/anesthesia are advised not to drive for 24 hours after their procedure.  We do not allow patients to drive themselves home after discharge.  If you are going home after your procedure, please have someone available to drive you home from the hospital.     You may call the Outpatient Surgery Department at [phone] with any questions or concerns.  We are looking forward to meeting you and taking great care of you for your procedure.  Thank you for choosing [facility] for your surgical needs.

## 2024-01-24 ENCOUNTER — HOSPITAL ENCOUNTER (OUTPATIENT)
Facility: HOSPITAL | Age: 54
Discharge: HOME | End: 2024-01-24
Attending: ORTHOPAEDIC SURGERY | Admitting: ORTHOPAEDIC SURGERY
Payer: COMMERCIAL

## 2024-01-24 ENCOUNTER — ANESTHESIA (OUTPATIENT)
Dept: SURGERY | Facility: HOSPITAL | Age: 54
End: 2024-01-24
Payer: COMMERCIAL

## 2024-01-24 DIAGNOSIS — S52.132F TYPE III OPEN DISPLACED FRACTURE OF NECK OF LEFT RADIUS WITH ROUTINE HEALING, SUBSEQUENT ENCOUNTER: ICD-10-CM

## 2024-01-24 DIAGNOSIS — S52.132F TYPE III OPEN DISPLACED FRACTURE OF NECK OF LEFT RADIUS WITH ROUTINE HEALING, SUBSEQUENT ENCOUNTER: Primary | ICD-10-CM

## 2024-01-24 DIAGNOSIS — Z01.818 PREOP TESTING: ICD-10-CM

## 2024-01-24 DIAGNOSIS — S52.132C: Primary | ICD-10-CM

## 2024-01-24 DIAGNOSIS — M25.612 STIFFNESS OF LEFT SHOULDER JOINT: ICD-10-CM

## 2024-01-24 PROCEDURE — 63600175 PHARM REV CODE 636 W HCPCS: Performed by: ANESTHESIOLOGY

## 2024-01-24 PROCEDURE — 63600175 PHARM REV CODE 636 W HCPCS: Performed by: NURSE ANESTHETIST, CERTIFIED REGISTERED

## 2024-01-24 PROCEDURE — 63600175 PHARM REV CODE 636 W HCPCS: Performed by: ORTHOPAEDIC SURGERY

## 2024-01-24 PROCEDURE — D9220A PRA ANESTHESIA: Mod: ANES,,, | Performed by: ANESTHESIOLOGY

## 2024-01-24 PROCEDURE — 25000003 PHARM REV CODE 250: Performed by: NURSE ANESTHETIST, CERTIFIED REGISTERED

## 2024-01-24 PROCEDURE — 25000003 PHARM REV CODE 250: Performed by: ANESTHESIOLOGY

## 2024-01-24 PROCEDURE — 71000015 HC POSTOP RECOV 1ST HR: Performed by: ORTHOPAEDIC SURGERY

## 2024-01-24 PROCEDURE — D9220A PRA ANESTHESIA: Mod: CRNA,,, | Performed by: NURSE ANESTHETIST, CERTIFIED REGISTERED

## 2024-01-24 PROCEDURE — 71000016 HC POSTOP RECOV ADDL HR: Performed by: ORTHOPAEDIC SURGERY

## 2024-01-24 PROCEDURE — 36000706: Performed by: ORTHOPAEDIC SURGERY

## 2024-01-24 PROCEDURE — 36000707: Performed by: ORTHOPAEDIC SURGERY

## 2024-01-24 PROCEDURE — 37000008 HC ANESTHESIA 1ST 15 MINUTES: Performed by: ORTHOPAEDIC SURGERY

## 2024-01-24 PROCEDURE — 71000033 HC RECOVERY, INTIAL HOUR: Performed by: ORTHOPAEDIC SURGERY

## 2024-01-24 PROCEDURE — 25000003 PHARM REV CODE 250: Performed by: ORTHOPAEDIC SURGERY

## 2024-01-24 PROCEDURE — 37000009 HC ANESTHESIA EA ADD 15 MINS: Performed by: ORTHOPAEDIC SURGERY

## 2024-01-24 PROCEDURE — 20680 REMOVAL OF IMPLANT DEEP: CPT | Mod: 58,,, | Performed by: ORTHOPAEDIC SURGERY

## 2024-01-24 RX ORDER — OXYCODONE HYDROCHLORIDE 5 MG/1
5 TABLET ORAL ONCE
Status: COMPLETED | OUTPATIENT
Start: 2024-01-24 | End: 2024-01-24

## 2024-01-24 RX ORDER — MIDAZOLAM HYDROCHLORIDE 1 MG/ML
2 INJECTION INTRAMUSCULAR; INTRAVENOUS ONCE AS NEEDED
Status: DISCONTINUED | OUTPATIENT
Start: 2024-01-24 | End: 2024-01-24 | Stop reason: HOSPADM

## 2024-01-24 RX ORDER — DEXAMETHASONE SODIUM PHOSPHATE 4 MG/ML
INJECTION, SOLUTION INTRA-ARTICULAR; INTRALESIONAL; INTRAMUSCULAR; INTRAVENOUS; SOFT TISSUE
Status: DISCONTINUED | OUTPATIENT
Start: 2024-01-24 | End: 2024-01-24

## 2024-01-24 RX ORDER — VANCOMYCIN HYDROCHLORIDE 1 G/20ML
INJECTION, POWDER, LYOPHILIZED, FOR SOLUTION INTRAVENOUS
Status: DISCONTINUED | OUTPATIENT
Start: 2024-01-24 | End: 2024-01-24 | Stop reason: HOSPADM

## 2024-01-24 RX ORDER — HYDROMORPHONE HYDROCHLORIDE 2 MG/ML
0.2 INJECTION, SOLUTION INTRAMUSCULAR; INTRAVENOUS; SUBCUTANEOUS EVERY 5 MIN PRN
Status: DISCONTINUED | OUTPATIENT
Start: 2024-01-24 | End: 2024-01-24 | Stop reason: HOSPADM

## 2024-01-24 RX ORDER — SODIUM CHLORIDE, SODIUM GLUCONATE, SODIUM ACETATE, POTASSIUM CHLORIDE AND MAGNESIUM CHLORIDE 30; 37; 368; 526; 502 MG/100ML; MG/100ML; MG/100ML; MG/100ML; MG/100ML
INJECTION, SOLUTION INTRAVENOUS CONTINUOUS
Status: DISCONTINUED | OUTPATIENT
Start: 2024-01-24 | End: 2024-01-24 | Stop reason: HOSPADM

## 2024-01-24 RX ORDER — MUPIROCIN 20 MG/G
OINTMENT TOPICAL
Status: DISCONTINUED | OUTPATIENT
Start: 2024-01-24 | End: 2024-01-24 | Stop reason: HOSPADM

## 2024-01-24 RX ORDER — GLYCOPYRROLATE 0.2 MG/ML
INJECTION INTRAMUSCULAR; INTRAVENOUS
Status: DISCONTINUED | OUTPATIENT
Start: 2024-01-24 | End: 2024-01-24

## 2024-01-24 RX ORDER — LIDOCAINE HYDROCHLORIDE 20 MG/ML
INJECTION, SOLUTION EPIDURAL; INFILTRATION; INTRACAUDAL; PERINEURAL
Status: DISCONTINUED | OUTPATIENT
Start: 2024-01-24 | End: 2024-01-24

## 2024-01-24 RX ORDER — KETOROLAC TROMETHAMINE 10 MG/1
10 TABLET, FILM COATED ORAL EVERY 6 HOURS
Qty: 20 TABLET | Refills: 0 | Status: SHIPPED | OUTPATIENT
Start: 2024-01-24 | End: 2024-01-29

## 2024-01-24 RX ORDER — CEFAZOLIN SODIUM 2 G/50ML
2 SOLUTION INTRAVENOUS
Status: COMPLETED | OUTPATIENT
Start: 2024-01-24 | End: 2024-01-24

## 2024-01-24 RX ORDER — GABAPENTIN 300 MG/1
300 CAPSULE ORAL
Status: DISCONTINUED | OUTPATIENT
Start: 2024-01-24 | End: 2024-01-24

## 2024-01-24 RX ORDER — ONDANSETRON 4 MG/1
4 TABLET, ORALLY DISINTEGRATING ORAL ONCE
Status: COMPLETED | OUTPATIENT
Start: 2024-01-24 | End: 2024-01-24

## 2024-01-24 RX ORDER — OXYCODONE HYDROCHLORIDE 5 MG/1
5 TABLET ORAL EVERY 6 HOURS PRN
Qty: 28 TABLET | Refills: 0 | Status: SHIPPED | OUTPATIENT
Start: 2024-01-24 | End: 2024-01-31

## 2024-01-24 RX ORDER — ACETAMINOPHEN 500 MG
1000 TABLET ORAL
Status: COMPLETED | OUTPATIENT
Start: 2024-01-24 | End: 2024-01-24

## 2024-01-24 RX ORDER — DIPHENHYDRAMINE HYDROCHLORIDE 50 MG/ML
25 INJECTION INTRAMUSCULAR; INTRAVENOUS EVERY 6 HOURS PRN
Status: DISCONTINUED | OUTPATIENT
Start: 2024-01-24 | End: 2024-01-24 | Stop reason: HOSPADM

## 2024-01-24 RX ORDER — MIDAZOLAM HYDROCHLORIDE 1 MG/ML
INJECTION INTRAMUSCULAR; INTRAVENOUS
Status: DISCONTINUED | OUTPATIENT
Start: 2024-01-24 | End: 2024-01-24

## 2024-01-24 RX ORDER — PROPOFOL 10 MG/ML
VIAL (ML) INTRAVENOUS
Status: DISCONTINUED | OUTPATIENT
Start: 2024-01-24 | End: 2024-01-24

## 2024-01-24 RX ORDER — PHENYLEPHRINE HCL IN 0.9% NACL 1 MG/10 ML
SYRINGE (ML) INTRAVENOUS
Status: DISCONTINUED | OUTPATIENT
Start: 2024-01-24 | End: 2024-01-24

## 2024-01-24 RX ORDER — ONDANSETRON HYDROCHLORIDE 2 MG/ML
4 INJECTION, SOLUTION INTRAVENOUS DAILY PRN
Status: DISCONTINUED | OUTPATIENT
Start: 2024-01-24 | End: 2024-01-24 | Stop reason: HOSPADM

## 2024-01-24 RX ORDER — ROCURONIUM BROMIDE 10 MG/ML
INJECTION, SOLUTION INTRAVENOUS
Status: DISCONTINUED | OUTPATIENT
Start: 2024-01-24 | End: 2024-01-24

## 2024-01-24 RX ORDER — FENTANYL CITRATE 50 UG/ML
INJECTION, SOLUTION INTRAMUSCULAR; INTRAVENOUS
Status: DISCONTINUED | OUTPATIENT
Start: 2024-01-24 | End: 2024-01-24

## 2024-01-24 RX ORDER — METOCLOPRAMIDE HYDROCHLORIDE 5 MG/ML
10 INJECTION INTRAMUSCULAR; INTRAVENOUS EVERY 10 MIN PRN
Status: DISCONTINUED | OUTPATIENT
Start: 2024-01-24 | End: 2024-01-24 | Stop reason: HOSPADM

## 2024-01-24 RX ORDER — LIDOCAINE HYDROCHLORIDE 10 MG/ML
1 INJECTION, SOLUTION EPIDURAL; INFILTRATION; INTRACAUDAL; PERINEURAL ONCE
Status: DISCONTINUED | OUTPATIENT
Start: 2024-01-24 | End: 2024-01-24 | Stop reason: HOSPADM

## 2024-01-24 RX ADMIN — FENTANYL CITRATE 50 MCG: 50 INJECTION, SOLUTION INTRAMUSCULAR; INTRAVENOUS at 08:01

## 2024-01-24 RX ADMIN — HYDROMORPHONE HYDROCHLORIDE 0.2 MG: 2 INJECTION INTRAMUSCULAR; INTRAVENOUS; SUBCUTANEOUS at 08:01

## 2024-01-24 RX ADMIN — SODIUM CHLORIDE, SODIUM GLUCONATE, SODIUM ACETATE, POTASSIUM CHLORIDE AND MAGNESIUM CHLORIDE: 526; 502; 368; 37; 30 INJECTION, SOLUTION INTRAVENOUS at 07:01

## 2024-01-24 RX ADMIN — GLYCOPYRROLATE 0.2 MG: 0.2 INJECTION INTRAMUSCULAR; INTRAVENOUS at 07:01

## 2024-01-24 RX ADMIN — LIDOCAINE HYDROCHLORIDE 4 ML: 20 INJECTION, SOLUTION EPIDURAL; INFILTRATION; INTRACAUDAL; PERINEURAL at 07:01

## 2024-01-24 RX ADMIN — ROCURONIUM BROMIDE 50 MG: 10 SOLUTION INTRAVENOUS at 07:01

## 2024-01-24 RX ADMIN — HYDROMORPHONE HYDROCHLORIDE 0.2 MG: 2 INJECTION INTRAMUSCULAR; INTRAVENOUS; SUBCUTANEOUS at 09:01

## 2024-01-24 RX ADMIN — PROPOFOL 150 MG: 10 INJECTION, EMULSION INTRAVENOUS at 07:01

## 2024-01-24 RX ADMIN — OXYCODONE HYDROCHLORIDE 5 MG: 5 TABLET ORAL at 09:01

## 2024-01-24 RX ADMIN — CEFAZOLIN SODIUM 2 G: 2 SOLUTION INTRAVENOUS at 07:01

## 2024-01-24 RX ADMIN — Medication 100 MCG: at 07:01

## 2024-01-24 RX ADMIN — SUGAMMADEX 200 MG: 100 INJECTION, SOLUTION INTRAVENOUS at 08:01

## 2024-01-24 RX ADMIN — ACETAMINOPHEN 1000 MG: 500 TABLET ORAL at 06:01

## 2024-01-24 RX ADMIN — MUPIROCIN: 20 OINTMENT TOPICAL at 05:01

## 2024-01-24 RX ADMIN — ONDANSETRON 4 MG: 4 TABLET, ORALLY DISINTEGRATING ORAL at 06:01

## 2024-01-24 RX ADMIN — MIDAZOLAM HYDROCHLORIDE 2 MG: 1 INJECTION, SOLUTION INTRAMUSCULAR; INTRAVENOUS at 07:01

## 2024-01-24 RX ADMIN — DEXAMETHASONE SODIUM PHOSPHATE 4 MG: 4 INJECTION, SOLUTION INTRA-ARTICULAR; INTRALESIONAL; INTRAMUSCULAR; INTRAVENOUS; SOFT TISSUE at 07:01

## 2024-01-24 RX ADMIN — FENTANYL CITRATE 100 MCG: 50 INJECTION, SOLUTION INTRAMUSCULAR; INTRAVENOUS at 07:01

## 2024-01-24 RX ADMIN — FENTANYL CITRATE 50 MCG: 50 INJECTION, SOLUTION INTRAMUSCULAR; INTRAVENOUS at 07:01

## 2024-01-24 NOTE — BRIEF OP NOTE
[facility] - Periop Services  Brief Operative Note    Surgery Date: 1/24/2024     Surgeon(s) and Role:     * Marco Black MD - Primary    Assisting Surgeon: None    Pre-op Diagnosis:  Type III open displaced fracture of neck of left radius with routine healing, subsequent encounter [S52.132F]  Preop testing [Z01.818]    Post-op Diagnosis:  Post-Op Diagnosis Codes:     * Type III open displaced fracture of neck of left radius with routine healing, subsequent encounter [S52.132F]     * Preop testing [Z01.818]    Procedure(s) (LRB):  REMOVAL,ORTHOPEDIC HARDWARE,LEFT ELBOW (Left)  LOU L elbow    Anesthesia: General    Operative Findings: see op report    Estimated Blood Loss: 20mL         Specimens:   Specimen (24h ago, onward)      None              Discharge Note    OUTCOME: Patient tolerated treatment/procedure well without complication and is now ready for discharge.    DISPOSITION: Home or Self Care    FINAL DIAGNOSIS:  Open displaced fracture of neck of left radius, type IIIA, IIIB, or IIIC    FOLLOWUP: In clinic    DISCHARGE INSTRUCTIONS:    Discharge Procedure Orders   Diet Adult Regular     Ice to affected area     Keep surgical extremity elevated     Remove dressing in 48 hours     Activity as tolerated     Weight bearing restrictions (specify):     Shower on day dressing removed (No bath)         Marco Black MD  Orthopedic Trauma  [facility]

## 2024-01-24 NOTE — DISCHARGE INSTRUCTIONS
-NO driving and NO alcohol consumption for 24 hours and while taking narcotic pain medication.    -Follow up appointment scheduled for: Thurs. Feb 8 @ 8:15am    -Wound care: begin dressing changes in 48 hours. May shower once dressing is removed. No baths.    -Monitor for signs of INFECTION: redness, swelling, drainage/pus/foul odor, fever, chills. Also, monitor extremity for good circulation (pink, warm, etc)    - Weight bearing status: weight bearing as tolerated to affected extremity    -Apply ICE and ELEVATE extremity as needed to aid in pain and inflammation.    -Report to your nearest ER/Notify your doctor if you experience any SUDDEN/SEVERE chest pain/abdominal pain, weakness, trouble breathing, uncontrolled pain.

## 2024-01-24 NOTE — ANESTHESIA POSTPROCEDURE EVALUATION
Anesthesia Post Evaluation    Patient: Fabio Mcclendon    Procedure(s) Performed: Procedure(s) (LRB):  REMOVAL,ORTHOPEDIC HARDWARE,LEFT ELBOW (Left)    Final Anesthesia Type: general      Patient location during evaluation: PACU  Patient participation: Yes- Able to Participate  Level of consciousness: awake and alert and oriented  Post-procedure vital signs: reviewed and stable  Pain management: adequate  Airway patency: patent  LEA mitigation strategies: Verification of full reversal of neuromuscular block  PONV status at discharge: No PONV  Anesthetic complications: no      Cardiovascular status: blood pressure returned to baseline and stable  Respiratory status: spontaneous ventilation and unassisted  Hydration status: euvolemic  Follow-up not needed.  Comments: [facility]              Vitals Value Taken Time   /67 01/24/24 0931   Temp 36.9 °C (98.4 °F) 01/24/24 0819   Pulse 64 01/24/24 0941   Resp 18 01/24/24 0939   SpO2 95 % 01/24/24 0941         Event Time   Out of Recovery 09:13:00         Pain/Hyacinth Score: Pain Rating Prior to Med Admin: 9 (1/24/2024  9:39 AM)  Hyacinth Score: 10 (1/24/2024 10:15 AM)  Modified Hyacinth Score: 19 (1/24/2024 10:15 AM)

## 2024-01-24 NOTE — TRANSFER OF CARE
"Anesthesia Transfer of Care Note    Patient: Fabio Mcclendon    Procedure(s) Performed: Procedure(s) (LRB):  REMOVAL,ORTHOPEDIC HARDWARE,LEFT ELBOW (Left)    Patient location: PACU    Anesthesia Type: general    Transport from OR: Transported from OR on room air with adequate spontaneous ventilation    Post pain: adequate analgesia    Post assessment: no apparent anesthetic complications    Post vital signs: stable    Level of consciousness: responds to stimulation    Nausea/Vomiting: no nausea/vomiting    Complications: none    Transfer of care protocol was followed      Last vitals: Visit Vitals  /77   Pulse 60   Temp 36.8 °C (98.3 °F) (Oral)   Resp 20   Ht 5' 9" (1.753 m)   Wt 89.4 kg (197 lb 1.5 oz)   SpO2 95%   BMI 29.11 kg/m²     "

## 2024-01-24 NOTE — ANESTHESIA PROCEDURE NOTES
Intubation    Date/Time: 1/24/2024 7:13 AM    Performed by: Perico Chen CRNA  Authorized by: Nilson Greco MD    Intubation:     Induction:  Intravenous    Intubated:  Postinduction    Mask Ventilation:  Easy with oral airway    Attempts:  1    Attempted By:  CRNA    Method of Intubation:  Direct    Blade:  Webster 2    Laryngeal View Grade: Grade IIA - cords partially seen      Difficult Airway Encountered?: No      Complications:  None    Airway Device:  Oral endotracheal tube    Airway Device Size:  7.5    Style/Cuff Inflation:  Cuffed (inflated to minimal occlusive pressure)    Inflation Amount (mL):  6    Tube secured:  21    Secured at:  The lips    Placement Verified By:  Capnometry    Complicating Factors:  None    Findings Post-Intubation:  BS equal bilateral and atraumatic/condition of teeth unchanged

## 2024-01-24 NOTE — INTERVAL H&P NOTE
I have seen the patient and reviewed this note, and there is no change in history and physical since the last exam.    Marco Black MD

## 2024-01-24 NOTE — DISCHARGE SUMMARY
[facility] - Periop Services  Discharge Note  Short Stay    Procedure(s) (LRB):  REMOVAL,ORTHOPEDIC HARDWARE,LEFT ELBOW (Left)      OUTCOME: Patient tolerated treatment/procedure well without complication and is now ready for discharge.    DISPOSITION: Home or Self Care    FINAL DIAGNOSIS:  L elbow retained hardware    FOLLOWUP: In clinic    DISCHARGE INSTRUCTIONS:    - d/c home  -wbat to LUE, full ROM   -begin dressing changes in 48 hrs  -resume PT in 2 days  -f/u in 2 weeks    TIME SPENT ON DISCHARGE: 15 minutes

## 2024-01-24 NOTE — OR NURSING
Hardware requested to be given back to patient, MD requests hardware not be sent to  lab for processing. Hardware cleansed and given to patient family.

## 2024-01-24 NOTE — OP NOTE
[facility]                       [address] [place]                      [place] [address]    PATIENT NAME:      LATRICIA DONOVAN   YOB: 1970  CSN:               [id number]  MRN:               [id number]  ADMIT DATE:        01/24/2024 05:06:00  PHYSICIAN:         Marco Black MD                          OPERATIVE REPORT      DATE OF SURGERY:    01/24/2024 00:00:00    SURGEON:  Marco Black MD    PREOPERATIVE DIAGNOSIS:  Left elbow open fracture dislocation and status post   internal fixation.    POSTOPERATIVE DIAGNOSIS:  Left elbow open fracture dislocation and status post   internal fixation.    PROCEDURES:    1. Removal of deep implant, left elbow.  2. Manipulation under anesthesia, left elbow.    ANESTHESIA:  General.    ESTIMATED BLOOD LOSS:  20 mL.    TOURNIQUET TIME:  14 minutes.    COMPLICATIONS:  None.    COUNTS:  All counts correct x2 at the end of the case.    INDICATIONS FOR PROCEDURE:  The patient is a 53-year-old male who had a fall   approximately 3 months ago and sustained a left elbow fracture dislocation of   the radial neck.  He underwent radial head replacement.  He had repair of his   ligaments of the elbow.  He had an internal joint stabilizing system placed with   plans for future removal.  He has progressed well with healing.  His hardware   has remained intact.  He has some continued stiffness in the elbow.  He is   returning to the operating room today for removal of the implant, for the   internal joint stabilizer as well as manipulation of the left elbow.    PROCEDURE IN DETAIL:  After informed consent was obtained, the patient was met   in the preoperative holding area and the site was marked.  He was taken to the   operating room.  He was placed supine on the operating table and general   anesthesia was induced.  All bony prominences were well padded.  Preoperative   antibiotics were given.  His left  upper extremity was prepped and draped in   standard sterile fashion.  A time-out was done indicating correct operative limb   and procedure, and the limb was exsanguinated.  An incision was made over the   tip of the olecranon.  It was carried down to the plate for the IJS.  This was   removed in its entirety.  Another incision was made over the lateral aspect of   the elbow centered over the capitellar pin.  These joints were released using a   screwdriver, and the capitellar pin and the angled arm connecting the plate were   removed in their entirety as well.  These areas were thoroughly irrigated.  We   then manipulated the elbow under anesthesia with fluoroscopic guidance.  His   elbow remained concentrically reduced.  We were able to improve its flexion   breaking up adhesions with stretching of the elbow.  The tourniquet was released   and hemostasis was obtained.  The wound was irrigated and closed using #1   Vicryl, 2-0 Monocryl, and staples.  Xeroform, 4 x 4's, cast padding, and Ace   bandage were applied.  The patient was awakened, extubated, and taken to   recovery in stable condition.    POSTOPERATIVE PLAN:  He will be discharged home today.  Weightbearing as   tolerated to the left upper extremity, full range of motion of the left elbow.    He can resume his physical therapy and follow up in 2 weeks for removal of his   staples.        ______________________________  MD WOODY Miranda/AQS  DD:  01/24/2024  Time:  08:16AM  DT:  01/24/2024  Time:  09:28AM  Job #:  [id number]      OPERATIVE REPORT

## 2024-01-24 NOTE — ANESTHESIA PREPROCEDURE EVALUATION
01/24/2024  Fabio Mcclendon is a 53 y.o., male who presents with Open displaced fracture of the Left Radial neck(Left radius fracture).     Expand All Collapse All    Subjective:         Subjective   Patient ID: Fabio Mcclendon is a 53 y.o. male.          Chief Complaint   Patient presents with    Left Elbow - Follow-up       10 WEEK F/U LEFT RADIAL HEAD ARTHROPLASTY.  REPORTS IMPROVEMENT.         Patient is here today just over 2 months status post irrigation debridement of left open elbow fracture dislocation with left radial head replacement.  He had placement of an internal external fixator for elbow stability.  He is coming today for repeat clinical evaluation and to discuss removal of his IJS device          The pt. comes to [facility] for the noted procedure under [facility].        PMHx:  No medical history recorded     Surgical History:  INCISION AND DRAINAGE, UPPER EXTREMITY OPEN REDUCTION AND INTERNAL FIXATION (ORIF) OF INJURY OF ELBOW   INCISION AND DRAINAGE, UPPER EXTREMITY          Vital signs:        Lab Data:      EKG:            Pre-op Assessment    I have reviewed the Patient Summary Reports.     I have reviewed the Nursing Notes. I have reviewed the NPO Status.   I have reviewed the Medications.     Review of Systems  Anesthesia Hx:  No problems with previous Anesthesia                Social:  Non-Smoker       Hematology/Oncology:  Hematology Normal   Oncology Normal                                   EENT/Dental:  EENT/Dental Normal           Cardiovascular:  Cardiovascular Normal Exercise tolerance: good                   Functional Capacity good / => 4 METS                         Pulmonary:  Pulmonary Normal                       Renal/:  Renal/ Normal                 Hepatic/GI:  Hepatic/GI Normal                 Musculoskeletal:  Musculoskeletal Normal                 Neurological:  Neurology Normal                                      Endocrine:  Endocrine Normal            Dermatological:  Skin Normal    Psych:  Psychiatric Normal                    Physical Exam  General: Alert, Oriented, Well nourished and Cooperative    Airway:  Mallampati: II   Mouth Opening: Normal  TM Distance: Normal  Tongue: Normal  Neck ROM: Normal ROM    Dental:  Intact    Chest/Lungs:  Clear to auscultation, Normal Respiratory Rate    Heart:  Rate: Normal  Rhythm: Regular Rhythm        Anesthesia Plan  Type of Anesthesia, risks & benefits discussed:    Anesthesia Type: Gen ETT  Intra-op Monitoring Plan: Standard ASA Monitors  Post Op Pain Control Plan: IV/PO Opioids PRN  Induction:  IV and Inhalation  Airway Plan: Direct  Informed Consent: Informed consent signed with the Patient and all parties understand the risks and agree with anesthesia plan.  All questions answered. Patient consented to blood products? Yes  ASA Score: 2  Day of Surgery Review of History & Physical: H&P Update referred to the surgeon/provider.    Ready For Surgery From Anesthesia Perspective.     .

## 2024-01-25 VITALS
HEART RATE: 54 BPM | TEMPERATURE: 98 F | HEIGHT: 69 IN | OXYGEN SATURATION: 93 % | DIASTOLIC BLOOD PRESSURE: 71 MMHG | RESPIRATION RATE: 18 BRPM | SYSTOLIC BLOOD PRESSURE: 119 MMHG | WEIGHT: 197.06 LBS | BODY MASS INDEX: 29.19 KG/M2

## 2024-02-08 ENCOUNTER — OFFICE VISIT (OUTPATIENT)
Dept: ORTHOPEDICS | Facility: CLINIC | Age: 54
End: 2024-02-08
Payer: COMMERCIAL

## 2024-02-08 VITALS
WEIGHT: 197.06 LBS | DIASTOLIC BLOOD PRESSURE: 69 MMHG | BODY MASS INDEX: 29.19 KG/M2 | HEIGHT: 69 IN | HEART RATE: 67 BPM | RESPIRATION RATE: 18 BRPM | SYSTOLIC BLOOD PRESSURE: 113 MMHG

## 2024-02-08 DIAGNOSIS — S52.132F TYPE III OPEN DISPLACED FRACTURE OF NECK OF LEFT RADIUS WITH ROUTINE HEALING, SUBSEQUENT ENCOUNTER: ICD-10-CM

## 2024-02-08 DIAGNOSIS — S52.132F TYPE III OPEN DISPLACED FRACTURE OF NECK OF LEFT RADIUS WITH ROUTINE HEALING, SUBSEQUENT ENCOUNTER: Primary | ICD-10-CM

## 2024-02-08 PROCEDURE — 3078F DIAST BP <80 MM HG: CPT | Mod: CPTII,,, | Performed by: ORTHOPAEDIC SURGERY

## 2024-02-08 PROCEDURE — 3074F SYST BP LT 130 MM HG: CPT | Mod: CPTII,,, | Performed by: ORTHOPAEDIC SURGERY

## 2024-02-08 PROCEDURE — 1160F RVW MEDS BY RX/DR IN RCRD: CPT | Mod: CPTII,,, | Performed by: ORTHOPAEDIC SURGERY

## 2024-02-08 PROCEDURE — 99024 POSTOP FOLLOW-UP VISIT: CPT | Mod: ,,, | Performed by: ORTHOPAEDIC SURGERY

## 2024-02-08 PROCEDURE — 1159F MED LIST DOCD IN RCRD: CPT | Mod: CPTII,,, | Performed by: ORTHOPAEDIC SURGERY

## 2024-02-08 RX ORDER — GABAPENTIN 300 MG/1
CAPSULE ORAL
Qty: 120 CAPSULE | Refills: 0 | Status: SHIPPED | OUTPATIENT
Start: 2024-02-08 | End: 2024-03-07

## 2024-02-08 RX ORDER — METHOCARBAMOL 750 MG/1
750 TABLET, FILM COATED ORAL 3 TIMES DAILY
Qty: 90 TABLET | Refills: 0 | Status: SHIPPED | OUTPATIENT
Start: 2024-02-08 | End: 2024-03-12

## 2024-02-08 NOTE — PROGRESS NOTES
Subjective:       Patient ID: Fabio Mcclendon is a 53 y.o. male.    Chief Complaint   Patient presents with    Left Elbow - Post-op Evaluation     2 week f/u hwr left elbow, staples intact.  Reports improvement.        Patient is here today for follow-up evaluation 2 weeks status post removal of hardware from left elbow.  He had a temporary internal joint stabilizing system applied at time of his initial injury.  This was removed 2 weeks ago.  He has resumed his physical therapy.  He states that he has had an improvement in his range motion of the elbow hand and wrist since that time.  Pain well-controlled.  He continues to have some stiffness in the shoulder which he has been working with physical therapy to improve.        Review of Systems   Constitutional: Negative for chills, fever and malaise/fatigue.   HENT:  Negative for congestion and hearing loss.    Eyes:  Negative for visual disturbance.   Cardiovascular:  Negative for chest pain and syncope.   Respiratory:  Negative for cough and shortness of breath.    Hematologic/Lymphatic: Does not bruise/bleed easily.   Skin:  Negative for color change and suspicious lesions.   Musculoskeletal:  Negative for falls and neck pain.   Gastrointestinal:  Negative for abdominal pain, nausea and vomiting.   Genitourinary:  Negative for dysuria and hematuria.   Neurological:  Negative for numbness and sensory change.   Psychiatric/Behavioral:  Negative for altered mental status. The patient is not nervous/anxious.         Current Outpatient Medications on File Prior to Visit   Medication Sig Dispense Refill    [DISCONTINUED] gabapentin (NEURONTIN) 300 MG capsule Take 1 capsule (300 mg total) by mouth 2 (two) times daily AND 2 capsules (600 mg total) every evening. 120 capsule 0    [DISCONTINUED] methocarbamoL (ROBAXIN) 750 MG Tab Take 1 tablet (750 mg total) by mouth 3 (three) times daily. 90 tablet 0     No current facility-administered medications on file prior to visit.  "         Objective:      /69   Pulse 67   Resp 18   Ht 5' 9" (1.753 m)   Wt 89.4 kg (197 lb 1.5 oz)   BMI 29.11 kg/m²   Physical Exam  Constitutional:       General: He is not in acute distress.     Appearance: Normal appearance. He is not ill-appearing.   HENT:      Head: Normocephalic and atraumatic.      Nose: No congestion.   Eyes:      Extraocular Movements: Extraocular movements intact.   Cardiovascular:      Rate and Rhythm: Normal rate and regular rhythm.      Pulses: Normal pulses.   Pulmonary:      Effort: Pulmonary effort is normal.      Breath sounds: Normal breath sounds.   Abdominal:      General: There is no distension.      Palpations: Abdomen is soft.      Tenderness: There is no abdominal tenderness.   Musculoskeletal:      Comments: Left upper extremity:  Surgical incisions are all well healed.  Staples are removed today.  No significant changes with range motion of his elbow forearm wrist or digits.  He has noted an improvement in his  strength.  Palpable radial pulse.  Sensation light touch intact distally.   Skin:     General: Skin is warm and dry.   Neurological:      Mental Status: He is alert and oriented to person, place, and time. Mental status is at baseline.   Psychiatric:         Mood and Affect: Mood normal.         Behavior: Behavior normal.         Thought Content: Thought content normal.         Judgment: Judgment normal.        Body mass index is 29.11 kg/m².    Radiology:         Assessment:         1. Type III open displaced fracture of neck of left radius with routine healing, subsequent encounter                Plan:       He is doing very well today and I am happy with his progress.  He needs to continue to work with physical therapy on improving his supination and pronation as well as range motion of the elbow and shoulder.  He can weight bear as tolerated, continue full activities without restrictions.  I will see him back in 2 months for repeat clinical " evaluation with x-rays.  He is happy with this plan of care today and all questions and concerns were addressed.      This note/OR report was created with the assistance of  voice recognition software or phone  dictation.  There may be transcription errors as a result of using this technology however minimal. Effort has been made to assure accuracy of transcription but any obvious errors or omissions should be clarified with the author of the document.       Marco Black MD  Orthopedic Trauma  [facility]      Follow up in about 2 months (around 4/8/2024).    Type III open displaced fracture of neck of left radius with routine healing, subsequent encounter              No orders of the defined types were placed in this encounter.      Future Appointments   Date Time Provider Department Center   4/8/2024  1:15 PM Marco Black MD [facility]

## 2024-03-07 DIAGNOSIS — S52.132F TYPE III OPEN DISPLACED FRACTURE OF NECK OF LEFT RADIUS WITH ROUTINE HEALING, SUBSEQUENT ENCOUNTER: ICD-10-CM

## 2024-03-07 RX ORDER — GABAPENTIN 300 MG/1
CAPSULE ORAL
Qty: 120 CAPSULE | Refills: 0 | Status: SHIPPED | OUTPATIENT
Start: 2024-03-07 | End: 2024-04-10 | Stop reason: SDUPTHER

## 2024-03-12 DIAGNOSIS — S52.132F TYPE III OPEN DISPLACED FRACTURE OF NECK OF LEFT RADIUS WITH ROUTINE HEALING, SUBSEQUENT ENCOUNTER: ICD-10-CM

## 2024-03-12 RX ORDER — METHOCARBAMOL 750 MG/1
750 TABLET, FILM COATED ORAL 3 TIMES DAILY
Qty: 90 TABLET | Refills: 0 | Status: SHIPPED | OUTPATIENT
Start: 2024-03-12 | End: 2024-04-10 | Stop reason: SDUPTHER

## 2024-04-10 ENCOUNTER — OFFICE VISIT (OUTPATIENT)
Dept: ORTHOPEDICS | Facility: CLINIC | Age: 54
End: 2024-04-10
Payer: COMMERCIAL

## 2024-04-10 ENCOUNTER — HOSPITAL ENCOUNTER (OUTPATIENT)
Dept: RADIOLOGY | Facility: CLINIC | Age: 54
Discharge: HOME | End: 2024-04-10
Attending: ORTHOPAEDIC SURGERY
Payer: COMMERCIAL

## 2024-04-10 VITALS
WEIGHT: 197.06 LBS | HEIGHT: 69 IN | DIASTOLIC BLOOD PRESSURE: 73 MMHG | BODY MASS INDEX: 29.19 KG/M2 | SYSTOLIC BLOOD PRESSURE: 123 MMHG | HEART RATE: 56 BPM

## 2024-04-10 DIAGNOSIS — S52.132F TYPE III OPEN DISPLACED FRACTURE OF NECK OF LEFT RADIUS WITH ROUTINE HEALING, SUBSEQUENT ENCOUNTER: ICD-10-CM

## 2024-04-10 DIAGNOSIS — S52.132F TYPE III OPEN DISPLACED FRACTURE OF NECK OF LEFT RADIUS WITH ROUTINE HEALING, SUBSEQUENT ENCOUNTER: Primary | ICD-10-CM

## 2024-04-10 PROCEDURE — 99024 POSTOP FOLLOW-UP VISIT: CPT | Mod: ,,, | Performed by: ORTHOPAEDIC SURGERY

## 2024-04-10 PROCEDURE — 3078F DIAST BP <80 MM HG: CPT | Mod: CPTII,,, | Performed by: ORTHOPAEDIC SURGERY

## 2024-04-10 PROCEDURE — 1160F RVW MEDS BY RX/DR IN RCRD: CPT | Mod: CPTII,,, | Performed by: ORTHOPAEDIC SURGERY

## 2024-04-10 PROCEDURE — 3074F SYST BP LT 130 MM HG: CPT | Mod: CPTII,,, | Performed by: ORTHOPAEDIC SURGERY

## 2024-04-10 PROCEDURE — 73080 X-RAY EXAM OF ELBOW: CPT | Mod: LT,,, | Performed by: ORTHOPAEDIC SURGERY

## 2024-04-10 PROCEDURE — 1159F MED LIST DOCD IN RCRD: CPT | Mod: CPTII,,, | Performed by: ORTHOPAEDIC SURGERY

## 2024-04-10 NOTE — PROGRESS NOTES
Subjective:       Patient ID: Fabio Mcclendon is a 53 y.o. male.    Chief Complaint   Patient presents with    Left Elbow - Follow-up     11 week f/u from left elbow hw removal. Reports improvement in ROM. Denies increase pain or discomfort.         Patient is here today for follow-up evaluation just under 3 months status post removal of hardware from his left elbow.  He is doing very well.  He is very happy with his progress with PT.  He continues to work on range motion exercises and states that he was improving however he gets stiffness that rebound after his therapy.  He has noticed an improvement in his extension with the use of the Luz Elena splint.  He was concerned that he is having some issues with memory and balance which he attributes possibly the use of his gabapentin which he is taking 300 mg t.i.d. no other issues reported today.    Follow-up  Pertinent negatives include no abdominal pain, chest pain, chills, congestion, coughing, fever, nausea, neck pain, numbness or vomiting.       Review of Systems   Constitutional: Negative for chills, fever and malaise/fatigue.   HENT:  Negative for congestion and hearing loss.    Eyes:  Negative for visual disturbance.   Cardiovascular:  Negative for chest pain and syncope.   Respiratory:  Negative for cough and shortness of breath.    Hematologic/Lymphatic: Does not bruise/bleed easily.   Skin:  Negative for color change and suspicious lesions.   Musculoskeletal:  Negative for falls and neck pain.   Gastrointestinal:  Negative for abdominal pain, nausea and vomiting.   Genitourinary:  Negative for dysuria and hematuria.   Neurological:  Negative for numbness and sensory change.   Psychiatric/Behavioral:  Negative for altered mental status. The patient is not nervous/anxious.         Current Outpatient Medications on File Prior to Visit   Medication Sig Dispense Refill    gabapentin (NEURONTIN) 300 MG capsule TAKE 1 CAPSULE BY MOUTH TWICE DAILY AND 2 ONCE DAILY IN  "THE EVENING 120 capsule 0    methocarbamoL (ROBAXIN) 750 MG Tab TAKE 1 TABLET BY MOUTH THREE TIMES DAILY 90 tablet 0     No current facility-administered medications on file prior to visit.          Objective:      /73   Pulse (!) 56   Ht 5' 9" (1.753 m)   Wt 89.4 kg (197 lb 1.5 oz)   BMI 29.11 kg/m²   Physical Exam  Musculoskeletal:      Comments: Left upper extremity:  He lacks approximately 20° of full extension, flexion 135°.  Lacks approximately 10° of full supination which is a vast improvement compared to his previous evaluation.  He is neurovascularly intact distally.  All his surgical incisions and traumatic wounds are well healed.        Body mass index is 29.11 kg/m².    Radiology:   Left elbow three views:  Ulna humeral and radial capitellar joints are all concentrically reduced.  His radial head replacement is intact.      Assessment:         1. Type III open displaced fracture of neck of left radius with routine healing, subsequent encounter  X-Ray Elbow Complete Left              Plan:       He is doing very well today and I am very happy with his progress.  He needs to continue to work with therapy on maintaining his range of motion.  With regard to his gabapentin he can begin weaning himself down away from if he will refill it today and allow him to begin trials of 300 mg b.i.d. for a week and going down to 300 mg daily for a week until he was down and off of all the medication.  I do not feel that he was at his maximal medical improvement at this point he will continue to get improvement up to a year out from his injury.  He will return to see me in 4 months for repeat x-rays.  He is happy with this plan of care and all questions and concerns were addressed.      This note/OR report was created with the assistance of  voice recognition software or phone  dictation.  There may be transcription errors as a result of using this technology however minimal. Effort has been made to assure " accuracy of transcription but any obvious errors or omissions should be clarified with the author of the document.       Marco Black MD  Orthopedic Trauma  [facility]      Follow up in about 4 months (around 8/10/2024).    Type III open displaced fracture of neck of left radius with routine healing, subsequent encounter  -     X-Ray Elbow Complete Left; Future; Expected date: 04/10/2024              Orders Placed This Encounter   Procedures    X-Ray Elbow Complete Left     Standing Status:   Future     Number of Occurrences:   1     Standing Expiration Date:   4/9/2025     Order Specific Question:   May the Radiologist modify the order per protocol to meet the clinical needs of the patient?     Answer:   Yes     Order Specific Question:   Release to patient     Answer:   Immediate       Future Appointments   Date Time Provider Department Center   8/12/2024 10:30 AM Marco Black MD [facility]

## 2024-04-11 RX ORDER — METHOCARBAMOL 750 MG/1
750 TABLET, FILM COATED ORAL 3 TIMES DAILY
Qty: 90 TABLET | Refills: 0 | Status: SHIPPED | OUTPATIENT
Start: 2024-04-11 | End: 2024-05-14

## 2024-04-11 RX ORDER — GABAPENTIN 300 MG/1
CAPSULE ORAL
Qty: 120 CAPSULE | Refills: 0 | Status: SHIPPED | OUTPATIENT
Start: 2024-04-11 | End: 2024-05-09

## 2024-04-15 DIAGNOSIS — S52.132F TYPE III OPEN DISPLACED FRACTURE OF NECK OF LEFT RADIUS WITH ROUTINE HEALING, SUBSEQUENT ENCOUNTER: Primary | ICD-10-CM

## 2024-05-09 DIAGNOSIS — S52.132F TYPE III OPEN DISPLACED FRACTURE OF NECK OF LEFT RADIUS WITH ROUTINE HEALING, SUBSEQUENT ENCOUNTER: ICD-10-CM

## 2024-05-09 RX ORDER — GABAPENTIN 300 MG/1
CAPSULE ORAL
Qty: 120 CAPSULE | Refills: 0 | Status: SHIPPED | OUTPATIENT
Start: 2024-05-09 | End: 2024-05-14

## 2024-05-13 DIAGNOSIS — S52.132F TYPE III OPEN DISPLACED FRACTURE OF NECK OF LEFT RADIUS WITH ROUTINE HEALING, SUBSEQUENT ENCOUNTER: ICD-10-CM

## 2024-05-14 RX ORDER — METHOCARBAMOL 750 MG/1
750 TABLET, FILM COATED ORAL 3 TIMES DAILY
Qty: 90 TABLET | Refills: 0 | Status: SHIPPED | OUTPATIENT
Start: 2024-05-14

## 2024-05-14 RX ORDER — GABAPENTIN 300 MG/1
CAPSULE ORAL
Qty: 120 CAPSULE | Refills: 0 | Status: SHIPPED | OUTPATIENT
Start: 2024-05-14

## 2024-07-29 DIAGNOSIS — S52.132F TYPE III OPEN DISPLACED FRACTURE OF NECK OF LEFT RADIUS WITH ROUTINE HEALING, SUBSEQUENT ENCOUNTER: ICD-10-CM

## 2024-07-29 RX ORDER — GABAPENTIN 100 MG/1
CAPSULE ORAL
Qty: 120 CAPSULE | Refills: 0 | Status: SHIPPED | OUTPATIENT
Start: 2024-07-29

## 2024-07-29 RX ORDER — METHOCARBAMOL 750 MG/1
750 TABLET, FILM COATED ORAL 3 TIMES DAILY
Qty: 90 TABLET | Refills: 0 | Status: SHIPPED | OUTPATIENT
Start: 2024-07-29

## 2024-08-29 DIAGNOSIS — S52.132F TYPE III OPEN DISPLACED FRACTURE OF NECK OF LEFT RADIUS WITH ROUTINE HEALING, SUBSEQUENT ENCOUNTER: ICD-10-CM

## 2024-08-29 RX ORDER — GABAPENTIN 100 MG/1
CAPSULE ORAL
Qty: 120 CAPSULE | Refills: 0 | Status: SHIPPED | OUTPATIENT
Start: 2024-08-29

## 2024-09-17 ENCOUNTER — OFFICE VISIT (OUTPATIENT)
Dept: ORTHOPEDICS | Facility: CLINIC | Age: 54
End: 2024-09-17

## 2024-09-17 ENCOUNTER — HOSPITAL ENCOUNTER (OUTPATIENT)
Dept: RADIOLOGY | Facility: CLINIC | Age: 54
Discharge: HOME | End: 2024-09-17
Attending: ORTHOPAEDIC SURGERY
Payer: COMMERCIAL

## 2024-09-17 VITALS
HEART RATE: 52 BPM | HEIGHT: 69 IN | DIASTOLIC BLOOD PRESSURE: 79 MMHG | SYSTOLIC BLOOD PRESSURE: 122 MMHG | WEIGHT: 197.06 LBS | BODY MASS INDEX: 29.19 KG/M2

## 2024-09-17 DIAGNOSIS — S52.132F TYPE III OPEN DISPLACED FRACTURE OF NECK OF LEFT RADIUS WITH ROUTINE HEALING, SUBSEQUENT ENCOUNTER: Primary | ICD-10-CM

## 2024-09-17 DIAGNOSIS — S52.132F TYPE III OPEN DISPLACED FRACTURE OF NECK OF LEFT RADIUS WITH ROUTINE HEALING, SUBSEQUENT ENCOUNTER: ICD-10-CM

## 2024-09-17 PROCEDURE — 73080 X-RAY EXAM OF ELBOW: CPT | Mod: LT,,, | Performed by: ORTHOPAEDIC SURGERY

## 2024-09-17 NOTE — PROGRESS NOTES
Subjective:       Patient ID: Fabio Mcclendon is a 53 y.o. male.    Chief Complaint   Patient presents with    Follow-up     8 mos, HWR LEFT ELBOW, sx 1/24/24, reports minimal to moderate pain at times, no other complaints,         Patient is here today for follow-up evaluation proximally 10 months status post injury to the left elbow open fracture dislocation with a radial head replacement and wound closure.  He has been doing well.  He has completed a course of physical therapy.  He continues to work on range motion and strengthening exercises.  He reports some discomfort in the elbow on occasion but mainly his main concern is weakness in the upper extremity.  He will be transitioning his care to another physician in [place] closer to home.    Follow-up  Pertinent negatives include no abdominal pain, chest pain, chills, congestion, coughing, fever, nausea, neck pain, numbness or vomiting.       Review of Systems   Constitutional: Negative for chills, fever and malaise/fatigue.   HENT:  Negative for congestion and hearing loss.    Eyes:  Negative for visual disturbance.   Cardiovascular:  Negative for chest pain and syncope.   Respiratory:  Negative for cough and shortness of breath.    Hematologic/Lymphatic: Does not bruise/bleed easily.   Skin:  Negative for color change and suspicious lesions.   Musculoskeletal:  Negative for falls and neck pain.   Gastrointestinal:  Negative for abdominal pain, nausea and vomiting.   Genitourinary:  Negative for dysuria and hematuria.   Neurological:  Negative for numbness and sensory change.   Psychiatric/Behavioral:  Negative for altered mental status. The patient is not nervous/anxious.         Current Outpatient Medications on File Prior to Visit   Medication Sig Dispense Refill    gabapentin (NEURONTIN) 100 MG capsule TAKE 1 CAPSULE BY MOUTH TWICE DAILY AND 2 ONCE DAILY IN THE EVENING 120 capsule 0    methocarbamoL (ROBAXIN) 750 MG Tab Take 1 tablet (750 mg total) by  "mouth 3 (three) times daily. 90 tablet 0     No current facility-administered medications on file prior to visit.          Objective:      /79   Pulse (!) 52   Ht 5' 9" (1.753 m)   Wt 89.4 kg (197 lb 1.5 oz)   BMI 29.11 kg/m²   Physical Exam  Constitutional:       General: He is not in acute distress.     Appearance: Normal appearance. He is not ill-appearing.   HENT:      Head: Normocephalic and atraumatic.      Nose: No congestion.   Eyes:      Extraocular Movements: Extraocular movements intact.   Cardiovascular:      Rate and Rhythm: Normal rate and regular rhythm.      Pulses: Normal pulses.   Pulmonary:      Effort: Pulmonary effort is normal.      Breath sounds: Normal breath sounds.   Abdominal:      General: There is no distension.      Palpations: Abdomen is soft.      Tenderness: There is no abdominal tenderness.   Musculoskeletal:      Comments: Left upper extremity: Surgical incision and traumatic wounds are all well healed.  He lacks approximately 20° of full extension of the left elbow, flexion 135°.  No varus or valgus instability.  Good supination and pronation, intact EPL/FPL, EDC/FDP and interossei.  Sensation light touch intact distally.  He has some diminished sensation over the medial aspect of his upper forearm that has been persistent near the traumatic wound.   Skin:     General: Skin is warm and dry.   Neurological:      Mental Status: He is alert and oriented to person, place, and time. Mental status is at baseline.   Psychiatric:         Mood and Affect: Mood normal.         Behavior: Behavior normal.         Thought Content: Thought content normal.         Judgment: Judgment normal.        Body mass index is 29.11 kg/m².    Radiology:   Left elbow three views:  Radial head replacement is intact, elbow joint remains well reduced alignment unchanged compared to previous films.      Assessment:         1. Type III open displaced fracture of neck of left radius with routine " healing, subsequent encounter  X-Ray Elbow Complete Left              Plan:       He is now off of his muscle relaxers and is weaning down off of his gabapentin, currently taking 300 mg a day. I encouraged him to continue to wean himself down off of it as tolerated.  He can perform full activities without restrictions to the left upper extremity.  I encouraged him to continue to work on range motion and strengthening on his own using the exercises he has received from physical therapy.  We will be happy to forward any and all documentation he may require to any new physicians that we will be managing his care going forward.  He can follow-up with me on an as-needed basis should any new issues arise for him in the future.  He is happy with this plan of care today and all questions and concerns were addressed.    This note/OR report was created with the assistance of  voice recognition software or phone  dictation.  There may be transcription errors as a result of using this technology however minimal. Effort has been made to assure accuracy of transcription but any obvious errors or omissions should be clarified with the author of the document.       Marco Black MD  Orthopedic Trauma  [facility]      Follow up if symptoms worsen or fail to improve.    Type III open displaced fracture of neck of left radius with routine healing, subsequent encounter  -     X-Ray Elbow Complete Left; Future; Expected date: 09/17/2024              Orders Placed This Encounter   Procedures    X-Ray Elbow Complete Left     Standing Status:   Future     Number of Occurrences:   1     Standing Expiration Date:   9/16/2025     Order Specific Question:   May the Radiologist modify the order per protocol to meet the clinical needs of the patient?     Answer:   Yes     Order Specific Question:   Release to patient     Answer:   Immediate       No future appointments.

## 2024-09-25 DIAGNOSIS — S52.132F TYPE III OPEN DISPLACED FRACTURE OF NECK OF LEFT RADIUS WITH ROUTINE HEALING, SUBSEQUENT ENCOUNTER: ICD-10-CM

## 2024-09-25 RX ORDER — GABAPENTIN 100 MG/1
CAPSULE ORAL
Qty: 120 CAPSULE | Refills: 0 | Status: SHIPPED | OUTPATIENT
Start: 2024-09-25

## (undated) DEVICE — GLOVE PROTEXIS PI CRM 7

## (undated) DEVICE — GLOVE PROTEXIS BLUE LATEX 7

## (undated) DEVICE — Device

## (undated) DEVICE — GLOVE PROTEXIS LTX MICRO 8

## (undated) DEVICE — SOL NACL IRR 1000ML BTL

## (undated) DEVICE — SUT MCRYL PLUS 2-0 CT-1 36IN

## (undated) DEVICE — BANDAGE COMPR VELCLOSE 4INX5YD

## (undated) DEVICE — BANDAGE COMPR 6IN HOOK 6INX5YD

## (undated) DEVICE — DRAPE HAND STERILE

## (undated) DEVICE — GOWN SMARTGOWN 3XL XLONG

## (undated) DEVICE — BLADE LONG 31.0MM X 9.0MM

## (undated) DEVICE — BNDG COFLEX FOAM LF2 ST 6X5YD

## (undated) DEVICE — IRRIGATION SET Y-TYPE TUR/BLAD

## (undated) DEVICE — SUT PDS PLUS MONO 1 CT 36IN

## (undated) DEVICE — ELECTRODE REM POLYHESIVE II

## (undated) DEVICE — COVER TABLE HVY DTY 60X90IN

## (undated) DEVICE — TAPE SILK 3IN

## (undated) DEVICE — GLOVE PROTEXIS HYDROGEL SZ6

## (undated) DEVICE — PADDING CAST WEBRIL II 4YDX2IN

## (undated) DEVICE — GOWN SMARTSLEEVE AAMI LVL4 XL

## (undated) DEVICE — STAPLER SKIN PROXIMATE WIDE

## (undated) DEVICE — DECANTER FLUID TRNSF WHITE 9IN

## (undated) DEVICE — SET CYSTO IRR DRP CHMBR 84IN

## (undated) DEVICE — PADDING 4X4YD SPECIALIST100

## (undated) DEVICE — DRAPE ORTH SPLIT 77X108IN

## (undated) DEVICE — DRAPE C-ARMOR EQUIPMENT COVER

## (undated) DEVICE — BANDAGE ESMARK ELASTIC ST 4X9

## (undated) DEVICE — SPONGE COTTON TRAY 4X4IN

## (undated) DEVICE — SLING ARM COMFT NAVY BLU LG

## (undated) DEVICE — SUT VICRYL BR 1 GEN 27 CT-1

## (undated) DEVICE — APPLICATOR CHLORAPREP ORN 26ML

## (undated) DEVICE — STOCKINETTE IMPERVIOUS 16X54IN

## (undated) DEVICE — SPONGE GAUZE 4X4 12 PLY STRL

## (undated) DEVICE — DEVICE PLASMABLADE X 3.0S LT

## (undated) DEVICE — SUT ETHILON 2-0 FSLX 30 BLK

## (undated) DEVICE — DRAPE STERI U-SHAPED 47X51IN

## (undated) DEVICE — SUT ETHILON 3-0 FS-1 30

## (undated) DEVICE — DRESSING XEROFORM 5X9IN

## (undated) DEVICE — GLOVE 8 PROTEXIS PI ORTHO

## (undated) DEVICE — PAD CAST 6X4YD SPECIALISTIC

## (undated) DEVICE — GLOVE PROTEXIS NEU-THERA SZ6

## (undated) DEVICE — KIT SURGICAL TURNOVER

## (undated) DEVICE — SCREW T10 SHAFT DRIVER N CANN

## (undated) DEVICE — XPERIENCE

## (undated) DEVICE — CUFF ATS 2 PORT SNGL BLDR 18IN

## (undated) DEVICE — GLOVE PROTEXIS HYDROGEL SZ9

## (undated) DEVICE — BANDAGE ACE ELASTIC 6"

## (undated) DEVICE — GLOVE SURG PLYSPHRN ORTH SZ 8

## (undated) DEVICE — COVER FULLGUARD SHOE HIGH-TOP

## (undated) DEVICE — DRESSING XEROFORM FOIL PK 1X8

## (undated) DEVICE — GAUZE AVANT SPNG 4PLY STRL 4X4

## (undated) DEVICE — GLOVE PROTEXIS BLUE LATEX 9

## (undated) DEVICE — 2-0 MONOCRYL CT-2

## (undated) DEVICE — SLING ARM SMALL

## (undated) DEVICE — SUT 4/0 18IN ETHILON BL P3